# Patient Record
Sex: MALE | Race: WHITE | Employment: OTHER | URBAN - METROPOLITAN AREA
[De-identification: names, ages, dates, MRNs, and addresses within clinical notes are randomized per-mention and may not be internally consistent; named-entity substitution may affect disease eponyms.]

---

## 2020-11-28 ENCOUNTER — HOSPITAL ENCOUNTER (EMERGENCY)
Age: 80
Discharge: NURSING, REHAB OR LONG-TERM CARE | End: 2020-11-29
Attending: EMERGENCY MEDICINE
Payer: MEDICARE

## 2020-11-28 VITALS
BODY MASS INDEX: 31.88 KG/M2 | HEART RATE: 74 BPM | WEIGHT: 270 LBS | HEIGHT: 77 IN | RESPIRATION RATE: 18 BRPM | OXYGEN SATURATION: 97 % | SYSTOLIC BLOOD PRESSURE: 159 MMHG | DIASTOLIC BLOOD PRESSURE: 81 MMHG | TEMPERATURE: 98.2 F

## 2020-11-28 DIAGNOSIS — R45.1 AGITATION: Primary | ICD-10-CM

## 2020-11-28 LAB
ALBUMIN SERPL-MCNC: 3.7 G/DL (ref 3.5–5)
ALBUMIN/GLOB SERPL: 1.1 {RATIO} (ref 1.1–2.2)
ALP SERPL-CCNC: 41 U/L (ref 45–117)
ALT SERPL-CCNC: 52 U/L (ref 12–78)
ANION GAP SERPL CALC-SCNC: 7 MMOL/L (ref 5–15)
AST SERPL W P-5'-P-CCNC: 36 U/L (ref 15–37)
BASOPHILS # BLD: 0 K/UL (ref 0–0.1)
BASOPHILS NFR BLD: 0 % (ref 0–1)
BILIRUB SERPL-MCNC: 0.4 MG/DL (ref 0.2–1)
BUN SERPL-MCNC: 20 MG/DL (ref 6–20)
BUN/CREAT SERPL: 12 (ref 12–20)
CA-I BLD-MCNC: 9.1 MG/DL (ref 8.5–10.1)
CHLORIDE SERPL-SCNC: 105 MMOL/L (ref 97–108)
CO2 SERPL-SCNC: 28 MMOL/L (ref 21–32)
CREAT SERPL-MCNC: 1.66 MG/DL (ref 0.7–1.3)
DIFFERENTIAL METHOD BLD: NORMAL
EOSINOPHIL # BLD: 0.2 K/UL (ref 0–0.4)
EOSINOPHIL NFR BLD: 3 % (ref 0–7)
ERYTHROCYTE [DISTWIDTH] IN BLOOD BY AUTOMATED COUNT: 13.2 % (ref 11.5–14.5)
GLOBULIN SER CALC-MCNC: 3.4 G/DL (ref 2–4)
GLUCOSE SERPL-MCNC: 100 MG/DL (ref 65–100)
HCT VFR BLD AUTO: 40.4 % (ref 36.6–50.3)
HGB BLD-MCNC: 14 G/DL (ref 12.1–17)
IMM GRANULOCYTES # BLD AUTO: 0 K/UL (ref 0–0.04)
IMM GRANULOCYTES NFR BLD AUTO: 0 % (ref 0–0.5)
LYMPHOCYTES # BLD: 1 K/UL (ref 0.8–3.5)
LYMPHOCYTES NFR BLD: 16 % (ref 12–49)
MCH RBC QN AUTO: 31 PG (ref 26–34)
MCHC RBC AUTO-ENTMCNC: 34.7 G/DL (ref 30–36.5)
MCV RBC AUTO: 89.4 FL (ref 80–99)
MONOCYTES # BLD: 0.6 K/UL (ref 0–1)
MONOCYTES NFR BLD: 9 % (ref 5–13)
NEUTS SEG # BLD: 4.5 K/UL (ref 1.8–8)
NEUTS SEG NFR BLD: 72 % (ref 32–75)
PLATELET # BLD AUTO: 230 K/UL (ref 150–400)
PMV BLD AUTO: 9.1 FL (ref 8.9–12.9)
POTASSIUM SERPL-SCNC: 3.7 MMOL/L (ref 3.5–5.1)
PROT SERPL-MCNC: 7.1 G/DL (ref 6.4–8.2)
RBC # BLD AUTO: 4.52 M/UL (ref 4.1–5.7)
SODIUM SERPL-SCNC: 140 MMOL/L (ref 136–145)
TROPONIN I SERPL-MCNC: <0.05 NG/ML
WBC # BLD AUTO: 6.3 K/UL (ref 4.1–11.1)

## 2020-11-28 PROCEDURE — 74011250636 HC RX REV CODE- 250/636: Performed by: EMERGENCY MEDICINE

## 2020-11-28 PROCEDURE — 96374 THER/PROPH/DIAG INJ IV PUSH: CPT

## 2020-11-28 PROCEDURE — 96372 THER/PROPH/DIAG INJ SC/IM: CPT

## 2020-11-28 PROCEDURE — 74011000250 HC RX REV CODE- 250: Performed by: EMERGENCY MEDICINE

## 2020-11-28 PROCEDURE — 85025 COMPLETE CBC W/AUTO DIFF WBC: CPT

## 2020-11-28 PROCEDURE — 36415 COLL VENOUS BLD VENIPUNCTURE: CPT

## 2020-11-28 PROCEDURE — 93005 ELECTROCARDIOGRAM TRACING: CPT

## 2020-11-28 PROCEDURE — 81001 URINALYSIS AUTO W/SCOPE: CPT

## 2020-11-28 PROCEDURE — 84484 ASSAY OF TROPONIN QUANT: CPT

## 2020-11-28 PROCEDURE — 99284 EMERGENCY DEPT VISIT MOD MDM: CPT

## 2020-11-28 PROCEDURE — 80053 COMPREHEN METABOLIC PANEL: CPT

## 2020-11-28 RX ORDER — HALOPERIDOL 5 MG/ML
5 INJECTION INTRAMUSCULAR
Status: COMPLETED | OUTPATIENT
Start: 2020-11-28 | End: 2020-11-28

## 2020-11-28 RX ADMIN — WATER 10 MG: 1 INJECTION INTRAMUSCULAR; INTRAVENOUS; SUBCUTANEOUS at 21:43

## 2020-11-28 RX ADMIN — HALOPERIDOL LACTATE 5 MG: 5 INJECTION, SOLUTION INTRAMUSCULAR at 21:07

## 2020-11-29 LAB
APPEARANCE UR: ABNORMAL
ATRIAL RATE: 76 BPM
BACTERIA URNS QL MICRO: NEGATIVE /HPF
BILIRUB UR QL: NEGATIVE
CALCULATED P AXIS, ECG09: 60 DEGREES
CALCULATED R AXIS, ECG10: 19 DEGREES
CALCULATED T AXIS, ECG11: 70 DEGREES
COLOR UR: ABNORMAL
DIAGNOSIS, 93000: NORMAL
GLUCOSE UR STRIP.AUTO-MCNC: NEGATIVE MG/DL
HGB UR QL STRIP: ABNORMAL
KETONES UR QL STRIP.AUTO: NEGATIVE MG/DL
LEUKOCYTE ESTERASE UR QL STRIP.AUTO: NEGATIVE
NITRITE UR QL STRIP.AUTO: NEGATIVE
P-R INTERVAL, ECG05: 166 MS
PH UR STRIP: 5 [PH] (ref 5–8)
PROT UR STRIP-MCNC: 100 MG/DL
Q-T INTERVAL, ECG07: 424 MS
QRS DURATION, ECG06: 88 MS
QTC CALCULATION (BEZET), ECG08: 477 MS
RBC #/AREA URNS HPF: >100 /HPF (ref 0–5)
SP GR UR REFRACTOMETRY: 1.01 (ref 1–1.03)
UA: UC IF INDICATED,UAUC: ABNORMAL
UROBILINOGEN UR QL STRIP.AUTO: 0.1 EU/DL (ref 0.1–1)
VENTRICULAR RATE, ECG03: 76 BPM
WBC URNS QL MICRO: ABNORMAL /HPF (ref 0–4)

## 2020-11-29 NOTE — DISCHARGE INSTRUCTIONS
Be sure to follow up your PCP in 2-3 days, get repeat blood work, your creatinine is 1.66 today which suggests mild kidney dysfunction but does not explain your behavioral change. Return to ED for acute injuries, acute infectious signs, violent behavior or self harm behavior.

## 2020-11-29 NOTE — ED NOTES
Past Medical History:   Diagnosis Date    [facility] ([facility]     CAD (coronary artery disease)     Dementia ([facility]     Psychiatric disorder      History reviewed. No pertinent surgical history. 72yo male pt with significant PMH of alzheimer's disease and dementia presents to ED from [facility] with report from staff of altered mental status and combative behavior. Pt is unable to provide history due to tangentiality and word salad, confused and unredirectable, agitated, restless, wandering in room, frequent attempts to reorient to room and call bell, cardiac and continuous spO2 monitoring initiated, IV started, blood samples collected and sent to lab for analysis, EKG completed, medicated with Haldol and Geodon with little result, plan of care reviewed, call bell in reach, side rails up x2, will continue to monitor. 00:28 AM  Straight cath for urine, some bloody discharge noted, urine sample collected and sent, pt resting comfortably, awaiting results. 1:28 AM  Pt OOB, wandering in room, urinating of floor, found lying sideways on chair and bed. Cleaned, dressed, EMS at bedside for transport. Provider at bedside for dispo and follow up, all treatments completed as ordered. Discharge plan reviewed and paperwork signed, teaching completed regarding treatment received, medications and follow up care demonstrated and read back, IV removed, catheter intact, pain level within manageable comfortable limits, ambulatory to exit, gait steady, safety maintained.

## 2020-11-29 NOTE — ED PROVIDER NOTES
HPI   Chief Complaint   Patient presents with    Altered mental status   81yoM with hx CAD and psychiatric disorder presents from [facility] with report from staff of altered mental status and combative behavior. Pt is unable to provide history due to tangentiality and word salad. Past Medical History:   Diagnosis Date    [facility] ([facility]     CAD (coronary artery disease)     Dementia ([facility]     Psychiatric disorder        History reviewed. No pertinent surgical history. History reviewed. No pertinent family history. Social History     Socioeconomic History    Marital status:      Spouse name: Not on file    Number of children: Not on file    Years of education: Not on file    Highest education level: Not on file   Occupational History    Not on file   Social Needs    Financial resource strain: Not on file    Food insecurity     Worry: Not on file     Inability: Not on file    Transportation needs     Medical: Not on file     Non-medical: Not on file   Tobacco Use    Smoking status: Not on file   Substance and Sexual Activity    Alcohol use: Not Currently    Drug use: Not Currently    Sexual activity: Not on file   Lifestyle    Physical activity     Days per week: Not on file     Minutes per session: Not on file    Stress: Not on file   Relationships    Social connections     Talks on phone: Not on file     Gets together: Not on file     Attends Synagogue service: Not on file     Active member of club or organization: Not on file     Attends meetings of clubs or organizations: Not on file     Relationship status: Not on file    Intimate partner violence     Fear of current or ex partner: Not on file     Emotionally abused: Not on file     Physically abused: Not on file     Forced sexual activity: Not on file   Other Topics Concern    Not on file   Social History Narrative    Not on file         ALLERGIES: Patient has no known allergies.     Review of Systems   Unable to perform ROS: Psychiatric disorder       Vitals:    11/28/20 1934   BP: (!) 159/81   Pulse: 74   Resp: 18   Temp: 98.2 °F (36.8 °C)   SpO2: 97%   Weight: 122.5 kg (270 lb)   Height: 6' 5\" (1.956 m)            Physical Exam   Patient Vitals for the past 8 hrs:   Temp Pulse Resp BP SpO2   11/28/20 1934 98.2 °F (36.8 °C) 74 18 (!) 159/81 97 %        Nursing note and vitals reviewed. Constitutional: Walking around ED in NAD. Head: Normocephalic and atraumatic. Mouth/Throat: Airway patent. Moist mucous membranes. Eyes: EOMI. No Scleral icterus. Neck: Neck supple. Cardiovascular: Well perfused throughout. Pulmonary/Chest: No respiratory distress. Musculoskeletal: No gross injuries or deformities. Neurological: Alert and but not oriented. Cranial Nerves 2-12 intact. Moving all extremities. No gross deficits  Psych: Flat affect. Unable to assess mood. Speech is fluent but very tangential and mostly word salad. Not threatening toward staff. Intermittently redirectable. Skin: Skin is warm and dry. No rash noted. MDM   Ddx = baseline psychiatric problem, metabolic disarray, ACS, UTI. Low suspicion acute head injury. EKG read by me at 20:39 showing NSR rate 79, no STEMI, normal intervals. Pt given haldol for walking all over ED, not staying in his room, as we have a covid-19 pandemic and there are covid-19 PUIs in the ED he required haldol for his own safety. On re-assessment pt still trying to get out of his room. Given 10mg geodon with good improvement of behavior. Lab work shows creatinine 1.66, likely not prerenal or post obstructive as BUN is normal.   Signed out at 10pm to Dr. Ayoub Congress to f/u UA. Expect d/c to f/u PCP in 2-3 days. Labs Reviewed   METABOLIC PANEL, COMPREHENSIVE - Abnormal; Notable for the following components:       Result Value    Creatinine 1.66 (*)     GFR est AA 49 (*)     GFR est non-AA 40 (*)     Alk.  phosphatase 41 (*)     All other components within normal limits CBC WITH AUTOMATED DIFF   TROPONIN I   URINALYSIS W/ REFLEX CULTURE     No orders to display     Medications   haloperidol lactate (HALDOL) injection 5 mg (5 mg IntraVENous Given 11/28/20 2107)   ziprasidone (GEODON) 10 mg in sterile water (preservative free) 0.5 mL injection (10 mg IntraMUSCular Given 11/28/20 2143)     Thelma SUTHERLAND MD, am  the first and primary ED provider for this patient.           Procedures

## 2020-12-05 ENCOUNTER — APPOINTMENT (OUTPATIENT)
Dept: GENERAL RADIOLOGY | Age: 80
End: 2020-12-05
Attending: EMERGENCY MEDICINE
Payer: MEDICARE

## 2020-12-05 ENCOUNTER — APPOINTMENT (OUTPATIENT)
Dept: CT IMAGING | Age: 80
End: 2020-12-05
Attending: EMERGENCY MEDICINE
Payer: MEDICARE

## 2020-12-05 ENCOUNTER — HOSPITAL ENCOUNTER (EMERGENCY)
Age: 80
Discharge: HOME | End: 2020-12-05
Attending: EMERGENCY MEDICINE
Payer: MEDICARE

## 2020-12-05 VITALS
DIASTOLIC BLOOD PRESSURE: 86 MMHG | OXYGEN SATURATION: 96 % | WEIGHT: 274 LBS | HEART RATE: 92 BPM | HEIGHT: 74 IN | SYSTOLIC BLOOD PRESSURE: 164 MMHG | BODY MASS INDEX: 35.16 KG/M2 | TEMPERATURE: 97.7 F | RESPIRATION RATE: 18 BRPM

## 2020-12-05 DIAGNOSIS — S00.93XA CONTUSION OF HEAD, UNSPECIFIED PART OF HEAD, INITIAL ENCOUNTER: ICD-10-CM

## 2020-12-05 DIAGNOSIS — F02.80 ALZHEIMER'S DEMENTIA WITHOUT BEHAVIORAL DISTURBANCE, UNSPECIFIED TIMING OF DEMENTIA ONSET: ICD-10-CM
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
## 2020-12-05 DIAGNOSIS — R41.0 CONFUSION: ICD-10-CM

## 2020-12-05 DIAGNOSIS — G30.9 ALZHEIMER'S DEMENTIA WITHOUT BEHAVIORAL DISTURBANCE, UNSPECIFIED TIMING OF DEMENTIA ONSET: ICD-10-CM

## 2020-12-05 LAB
ALBUMIN SERPL-MCNC: 3.5 G/DL (ref 3.5–5)
ALBUMIN/GLOB SERPL: 0.9 {RATIO} (ref 1.1–2.2)
ALP SERPL-CCNC: 47 U/L (ref 45–117)
ALT SERPL-CCNC: 39 U/L (ref 12–78)
ANION GAP SERPL CALC-SCNC: 7 MMOL/L (ref 5–15)
APPEARANCE UR: CLEAR
AST SERPL W P-5'-P-CCNC: 42 U/L (ref 15–37)
ATRIAL RATE: 90 BPM
BACTERIA URNS QL MICRO: NEGATIVE /HPF
BASOPHILS # BLD: 0 K/UL (ref 0–0.1)
BASOPHILS NFR BLD: 0 % (ref 0–1)
BILIRUB SERPL-MCNC: 0.6 MG/DL (ref 0.2–1)
BILIRUB UR QL: NEGATIVE
BNP SERPL-MCNC: 224 PG/ML
BUN SERPL-MCNC: 24 MG/DL (ref 6–20)
BUN/CREAT SERPL: 17 (ref 12–20)
CA-I BLD-MCNC: 8.6 MG/DL (ref 8.5–10.1)
CALCULATED P AXIS, ECG09: 38 DEGREES
CALCULATED R AXIS, ECG10: 19 DEGREES
CALCULATED T AXIS, ECG11: 45 DEGREES
CHLORIDE SERPL-SCNC: 108 MMOL/L (ref 97–108)
CO2 SERPL-SCNC: 26 MMOL/L (ref 21–32)
COLOR UR: ABNORMAL
CREAT SERPL-MCNC: 1.38 MG/DL (ref 0.7–1.3)
DIAGNOSIS, 93000: NORMAL
DIFFERENTIAL METHOD BLD: ABNORMAL
EOSINOPHIL # BLD: 0 K/UL (ref 0–0.4)
EOSINOPHIL NFR BLD: 0 % (ref 0–7)
ERYTHROCYTE [DISTWIDTH] IN BLOOD BY AUTOMATED COUNT: 13 % (ref 11.5–14.5)
GLOBULIN SER CALC-MCNC: 3.9 G/DL (ref 2–4)
GLUCOSE SERPL-MCNC: 111 MG/DL (ref 65–100)
GLUCOSE UR STRIP.AUTO-MCNC: NEGATIVE MG/DL
HCT VFR BLD AUTO: 41.3 % (ref 36.6–50.3)
HGB BLD-MCNC: 14.7 G/DL (ref 12.1–17)
HGB UR QL STRIP: ABNORMAL
IMM GRANULOCYTES # BLD AUTO: 0 K/UL (ref 0–0.04)
IMM GRANULOCYTES NFR BLD AUTO: 0 % (ref 0–0.5)
KETONES UR QL STRIP.AUTO: 20 MG/DL
LEUKOCYTE ESTERASE UR QL STRIP.AUTO: NEGATIVE
LYMPHOCYTES # BLD: 0.5 K/UL (ref 0.8–3.5)
LYMPHOCYTES NFR BLD: 4 % (ref 12–49)
MCH RBC QN AUTO: 31.7 PG (ref 26–34)
MCHC RBC AUTO-ENTMCNC: 35.6 G/DL (ref 30–36.5)
MCV RBC AUTO: 89 FL (ref 80–99)
MONOCYTES # BLD: 0.8 K/UL (ref 0–1)
MONOCYTES NFR BLD: 8 % (ref 5–13)
MUCOUS THREADS URNS QL MICRO: ABNORMAL /LPF
NEUTS SEG # BLD: 9.3 K/UL (ref 1.8–8)
NEUTS SEG NFR BLD: 88 % (ref 32–75)
NITRITE UR QL STRIP.AUTO: NEGATIVE
P-R INTERVAL, ECG05: 176 MS
PH UR STRIP: 5 [PH] (ref 5–8)
PLATELET # BLD AUTO: 243 K/UL (ref 150–400)
PMV BLD AUTO: 9.3 FL (ref 8.9–12.9)
POTASSIUM SERPL-SCNC: 3.7 MMOL/L (ref 3.5–5.1)
PROT SERPL-MCNC: 7.4 G/DL (ref 6.4–8.2)
PROT UR STRIP-MCNC: >300 MG/DL
Q-T INTERVAL, ECG07: 386 MS
QRS DURATION, ECG06: 88 MS
QTC CALCULATION (BEZET), ECG08: 472 MS
RBC # BLD AUTO: 4.64 M/UL (ref 4.1–5.7)
RBC #/AREA URNS HPF: ABNORMAL /HPF (ref 0–5)
SODIUM SERPL-SCNC: 141 MMOL/L (ref 136–145)
SP GR UR REFRACTOMETRY: 1.02 (ref 1–1.03)
UA: UC IF INDICATED,UAUC: ABNORMAL
UROBILINOGEN UR QL STRIP.AUTO: 2 EU/DL (ref 0.1–1)
VENTRICULAR RATE, ECG03: 90 BPM
WBC # BLD AUTO: 10.6 K/UL (ref 4.1–11.1)
WBC URNS QL MICRO: ABNORMAL /HPF (ref 0–4)

## 2020-12-05 PROCEDURE — 71045 X-RAY EXAM CHEST 1 VIEW: CPT

## 2020-12-05 PROCEDURE — 83880 ASSAY OF NATRIURETIC PEPTIDE: CPT

## 2020-12-05 PROCEDURE — 72170 X-RAY EXAM OF PELVIS: CPT

## 2020-12-05 PROCEDURE — 81001 URINALYSIS AUTO W/SCOPE: CPT

## 2020-12-05 PROCEDURE — 85025 COMPLETE CBC W/AUTO DIFF WBC: CPT

## 2020-12-05 PROCEDURE — 70450 CT HEAD/BRAIN W/O DYE: CPT

## 2020-12-05 PROCEDURE — 99284 EMERGENCY DEPT VISIT MOD MDM: CPT

## 2020-12-05 PROCEDURE — 72125 CT NECK SPINE W/O DYE: CPT

## 2020-12-05 PROCEDURE — 93005 ELECTROCARDIOGRAM TRACING: CPT

## 2020-12-05 PROCEDURE — 36415 COLL VENOUS BLD VENIPUNCTURE: CPT

## 2020-12-05 PROCEDURE — 80053 COMPREHEN METABOLIC PANEL: CPT

## 2020-12-05 NOTE — ED NOTES
GCs 15 pt discharged back to [facility].  All personal  Belongings taken and report called

## 2020-12-05 NOTE — ED TRIAGE NOTES
Pt is from [facility] where staff found him on the floor with his tray table; unsure if pt had any LOC jose blood thinners; pt is alert

## 2020-12-05 NOTE — ED PROVIDER NOTES
EMERGENCY DEPARTMENT HISTORY AND PHYSICAL EXAM      Date: 12/5/2020  Patient Name: Sukhjinder Dodson    History of Presenting Illness     Chief Complaint   Patient presents with    Fall       History Provided By: Patient and EMS    HPI: Sukhjinder Dodson, 68 y.o. male presents to the ED with cc of   Chief Complaint   Patient presents with    Fall     Pt is from [facility] where staff found him on the floor with his tray table; unsure if pt had any LOC jose blood thinners; pt is alert  patient is states that he fell asleep denies any complaint at this time history of dementia and Alzheimer's disease there are no other complaints, changes, or physical findings at this time. PCP: Katherine Hall MD    No current facility-administered medications on file prior to encounter. No current outpatient medications on file prior to encounter. Past History     Past Medical History:  Past Medical History:   Diagnosis Date    [facility] ([facility] [address]     CAD (coronary artery disease)     Dementia ([facility]     Psychiatric disorder        Past Surgical History:  No past surgical history on file. Family History:  No family history on file. Social History:  Social History     Tobacco Use    Smoking status: Not on file   Substance Use Topics    Alcohol use: Not Currently    Drug use: Not Currently       Allergies:  No Known Allergies      Review of Systems   Review of Systems   Unable to perform ROS: Dementia   Constitutional: Negative. HENT: Negative for congestion, facial swelling, rhinorrhea and sore throat. Eyes: Negative. Negative for photophobia and pain. Respiratory: Negative for cough, shortness of breath and wheezing. Cardiovascular: Negative. Negative for chest pain. Gastrointestinal: Negative for abdominal distention and abdominal pain. Genitourinary: Negative. Musculoskeletal: Negative. Allergic/Immunologic: Negative for immunocompromised state. Neurological: Positive for syncope and weakness. Hematological: Negative. Psychiatric/Behavioral: Negative. Physical Exam   Physical Exam  Vitals signs and nursing note reviewed. Constitutional:       Appearance: Normal appearance. He is normal weight. HENT:      Head: Normocephalic and atraumatic. Jaw: There is normal jaw occlusion. Right Ear: Tympanic membrane and external ear normal.      Left Ear: Tympanic membrane and external ear normal.      Nose: Nose normal.      Mouth/Throat:      Mouth: Mucous membranes are moist.      Pharynx: Oropharynx is clear. Eyes:      General: Lids are normal.         Right eye: No foreign body or hordeolum. Left eye: No foreign body or hordeolum. Neck:      Musculoskeletal: Normal range of motion and neck supple. Normal range of motion. No neck rigidity or muscular tenderness. Thyroid: No thyroid mass. Vascular: No carotid bruit. Trachea: No tracheal tenderness. Cardiovascular:      Rate and Rhythm: Normal rate and regular rhythm. Pulses: Normal pulses. Heart sounds: Normal heart sounds. Pulmonary:      Effort: Pulmonary effort is normal.      Breath sounds: Normal breath sounds. Abdominal:      General: Abdomen is flat. Bowel sounds are normal.      Palpations: Abdomen is soft. There is no mass. Tenderness: There is no abdominal tenderness. There is no right CVA tenderness, left CVA tenderness, guarding or rebound. Hernia: No hernia is present. Musculoskeletal: Normal range of motion. Skin:     General: Skin is warm. Findings: No lesion or rash. Neurological:      General: No focal deficit present. Mental Status: He is alert. Mental status is at baseline. GCS: GCS eye subscore is 4. GCS verbal subscore is 5. GCS motor subscore is 6. Sensory: Sensation is intact. Motor: Motor function is intact. Deep Tendon Reflexes: Reflexes are normal and symmetric. Comments: Nonfocal   Psychiatric:         Attention and Perception: Attention and perception normal.         Mood and Affect: Mood is anxious. Diagnostic Study Results     Labs -     Recent Results (from the past 12 hour(s))   EKG, 12 LEAD, INITIAL    Collection Time: 12/05/20 12:16 PM   Result Value Ref Range    Ventricular Rate 90 BPM    Atrial Rate 90 BPM    P-R Interval 176 ms    QRS Duration 88 ms    Q-T Interval 386 ms    QTC Calculation (Bezet) 472 ms    Calculated P Axis 38 degrees    Calculated R Axis 19 degrees    Calculated T Axis 45 degrees    Diagnosis       Normal sinus rhythm  Nonspecific ST abnormality  Abnormal ECG  When compared with ECG of 28-NOV-2020 20:39,  No significant change was found  Confirmed by MORGAN OCAMPO MD ([id number]) on 12/5/2020 12:59:13 PM     BNP    Collection Time: 12/05/20 12:30 PM   Result Value Ref Range    NT pro- <450 pg/mL   CBC WITH AUTOMATED DIFF    Collection Time: 12/05/20 12:30 PM   Result Value Ref Range    WBC 10.6 4.1 - 11.1 K/uL    RBC 4.64 4.10 - 5.70 M/uL    HGB 14.7 12.1 - 17.0 g/dL    HCT 41.3 36.6 - 50.3 %    MCV 89.0 80.0 - 99.0 FL    MCH 31.7 26.0 - 34.0 PG    MCHC 35.6 30.0 - 36.5 g/dL    RDW 13.0 11.5 - 14.5 %    PLATELET 164 337 - 742 K/uL    MPV 9.3 8.9 - 12.9 FL    NEUTROPHILS 88 (H) 32 - 75 %    LYMPHOCYTES 4 (L) 12 - 49 %    MONOCYTES 8 5 - 13 %    EOSINOPHILS 0 0 - 7 %    BASOPHILS 0 0 - 1 %    IMMATURE GRANULOCYTES 0 0.0 - 0.5 %    ABS. NEUTROPHILS 9.3 (H) 1.8 - 8.0 K/UL    ABS. LYMPHOCYTES 0.5 (L) 0.8 - 3.5 K/UL    ABS. MONOCYTES 0.8 0.0 - 1.0 K/UL    ABS. EOSINOPHILS 0.0 0.0 - 0.4 K/UL    ABS. BASOPHILS 0.0 0.0 - 0.1 K/UL    ABS. IMM.  GRANS. 0.0 0.00 - 0.04 K/UL    DF AUTOMATED     METABOLIC PANEL, COMPREHENSIVE    Collection Time: 12/05/20 12:30 PM   Result Value Ref Range    Sodium 141 136 - 145 mmol/L    Potassium 3.7 3.5 - 5.1 mmol/L    Chloride 108 97 - 108 mmol/L    CO2 26 21 - 32 mmol/L    Anion gap 7 5 - 15 mmol/L    Glucose 111 (H) 65 - 100 mg/dL    BUN 24 (H) 6 - 20 mg/dL    Creatinine 1.38 (H) 0.70 - 1.30 mg/dL    BUN/Creatinine ratio 17 12 - 20      GFR est AA >60 >60 ml/min/1.73m2    GFR est non-AA 50 (L) >60 ml/min/1.73m2    Calcium 8.6 8.5 - 10.1 mg/dL    Bilirubin, total 0.6 0.2 - 1.0 mg/dL    AST (SGOT) 42 (H) 15 - 37 U/L    ALT (SGPT) 39 12 - 78 U/L    Alk. phosphatase 47 45 - 117 U/L    Protein, total 7.4 6.4 - 8.2 g/dL    Albumin 3.5 3.5 - 5.0 g/dL    Globulin 3.9 2.0 - 4.0 g/dL    A-G Ratio 0.9 (L) 1.1 - 2.2     URINALYSIS W/ REFLEX CULTURE    Collection Time: 12/05/20 12:35 PM    Specimen: Urine   Result Value Ref Range    Color Yellow/Straw      Appearance Clear Clear      Specific gravity 1.020 1.003 - 1.030      pH (UA) 5.0 5.0 - 8.0      Protein >300 (A) Negative mg/dL    Glucose Negative Negative mg/dL    Ketone 20 (A) Negative mg/dL    Bilirubin Negative Negative      Blood Small (A) Negative      Urobilinogen 2.0 (H) 0.1 - 1.0 EU/dL    Nitrites Negative Negative      Leukocyte Esterase Negative Negative      UA:UC IF INDICATED Culture not indicated by UA result Culture not indicated by UA result      WBC 0-4 0 - 4 /hpf    RBC 0-5 0 - 5 /hpf    Bacteria Negative Negative /hpf    Mucus Trace /lpf       Labs reviewed by me    Radiologic Studies -   XR CHEST SNGL V   Final Result   Impression:   No acute cardiopulmonary process. XR PELV 1 OR 2 V   Final Result   FINDINGS/IMPRESSION:   Examination negative for fracture, negative for dislocation. Senescent change   lumbar spine and bilateral hips, right greater than left. Punctate radiopaque foreign bodies project over the pubic symphysis, this may   represent sequela of prior hernia repair. If patient's pain persists or there is high clinical suspicion for a   radiographically occult fracture, further characterization can be obtained with   cross-sectional imaging. CT HEAD WO CONT   Final Result   Impression:      1. No acute finding.    2. Mild diffuse cerebral atrophy. 3. Intracranial atherosclerosis. CT SPINE CERV WO CONT   Final Result   Impression:    1. No acute abnormality of the cervical spine. 2. Multilevel cervical degenerative changes as detailed above. 3. Bilateral carotid bifurcation calcific atherosclerosis. CT Results  (Last 48 hours)               12/05/20 1245  CT HEAD WO CONT Final result    Impression:  Impression:       1. No acute finding. 2. Mild diffuse cerebral atrophy. 3. Intracranial atherosclerosis. Narrative: Indication: Fall. Comparison: None. Technique: Contiguous axial images of the brain were obtained from the base of   skull to the vertex without intravenous contrast.       Dose Reduction:        All CT scans at this facility are performed using dose reduction optimization   techniques as appropriate to a performed exam including the following: Automated   exposure control, adjustments of the mA and/or kV according to patient size, or   use of iterative reconstruction technique. Findings: The brain parenchyma is normal. There is no evidence of hemorrhage,   mass effect, or midline shift. Mild diffuse cerebral atrophy. The calvarium is   intact. Mastoid air cells and paranasal sinuses are clear. Intracranial   atherosclerosis. 12/05/20 1245  CT SPINE CERV WO CONT Final result    Impression:  Impression:    1. No acute abnormality of the cervical spine. 2. Multilevel cervical degenerative changes as detailed above. 3. Bilateral carotid bifurcation calcific atherosclerosis. Narrative:  CT cervical spine 12/4/2020: Indication: Fall. Comparison: None. Technique: Contiguous thin section axial images of the cervical spine were   obtained with coronal and sagittal reconstructions performed and evaluated.        Dose Reduction:        All CT scans at this facility are performed using dose reduction optimization   techniques as appropriate to a performed exam including the following: Automated   exposure control, adjustments of the mA and/or kV according to patient size, or   use of iterative reconstruction technique. Findings: No acute fracture. Mild diffuse cervical spondylosis without focal   herniation or stenosis. Asymmetric left-sided facet arthropathy with   uncovertebral spurring contribute to advanced left spondylotic foraminal   narrowing at C3-4, C4-5, C5-6, and C6-7. Mild bilateral carotid bifurcation   calcific atherosclerosis. Lung apices are clear. CXR Results  (Last 48 hours)               12/05/20 1307  XR CHEST SNGL V Final result    Impression:  Impression:   No acute cardiopulmonary process. Narrative:  XR CHEST SNGL V       Comparison: None       Findings: The lungs are adequately inflated without focal consolidation. Cardiac   silhouette is within normal limits for size, no evidence of cardiac   decompensation. The osseous structures are intact. Medical Decision Making     I am the first provider for this patient. I reviewed the vital signs, available nursing notes, past medical history, past surgical history, family history and social history. RADIOLOGY report and LABS reviewed by me    Vital Signs-Reviewed the patient's vital signs. Patient Vitals for the past 12 hrs:   Temp Pulse Resp BP SpO2   12/05/20 1213 97.7 °F (36.5 °C) 92 18 (!) 164/86 96 %       EKG interpretation: (Preliminary)  EKG done at 1216 shows normal sinus rhythm, normal axis, nonspecific ST with no ectopy, ventricular rate of 90        Records Reviewed: Nurse's note. Provider Notes (Medical Decision Making):    Patient presents with DIFF DX :         ED Course:   Initial assessment performed. The patients presenting problems have been discussed, and they are in agreement with the care plan formulated and outlined with them. I have encouraged them to ask questions as they arise throughout their visit. TREATMENT RESPONSE -Stable          Chica Brown MD      Disposition:  Discharged   Diagnostic tests were reviewed and questions answered. Diagnosis, care plan and treatment options were discussed. The patient understand instructions and will follow up as directed. Condition stable    Admitting Provider:  No admitting provider for patient encounter. Consulting Provider:  No ref. provider found       DISCHARGE PLAN:  1. There are no discharge medications for this patient. 2.   Follow-up Information     Follow up With Specialties Details Why Naveen Yanes MD Family Medicine In 1 day  [address] [facility] [address] [phone]          3. Return to ED if worse     Diagnosis     Clinical Impression:     ICD-10-CM ICD-9-CM    1. Fall, initial encounter  [address] XXXA E888.9    2. Contusion of head, unspecified part of head, initial encounter  S00.93XA 920    3. Confusion  R41.0 298.9    4. Alzheimer's dementia without behavioral disturbance, unspecified timing of [facility] ([facility]  G30.9 331.0     F02.80 294.10         Attestations:    Chica Brown MD    Please note that this dictation was completed with D[employer], the [employer] voice recognition software. Quite often unanticipated grammatical, syntax, homophones, and other interpretive errors are inadvertently transcribed by the computer software. Please disregard these errors. Please excuse any errors that have escaped final proofreading. Thank you.

## 2020-12-28 ENCOUNTER — HOSPITAL ENCOUNTER (INPATIENT)
Age: 80
LOS: 28 days | Discharge: NURSING, REHAB OR LONG-TERM CARE | DRG: 056 | End: 2021-01-26
Attending: EMERGENCY MEDICINE | Admitting: INTERNAL MEDICINE
Payer: MEDICARE

## 2020-12-28 ENCOUNTER — APPOINTMENT (OUTPATIENT)
Dept: CT IMAGING | Age: 80
DRG: 056 | End: 2020-12-28
Attending: EMERGENCY MEDICINE
Payer: MEDICARE

## 2020-12-28 ENCOUNTER — APPOINTMENT (OUTPATIENT)
Dept: GENERAL RADIOLOGY | Age: 80
DRG: 056 | End: 2020-12-28
Attending: EMERGENCY MEDICINE
Payer: MEDICARE

## 2020-12-28 DIAGNOSIS — R41.82 ALTERED MENTAL STATUS, UNSPECIFIED ALTERED MENTAL STATUS TYPE: ICD-10-CM

## 2020-12-28 DIAGNOSIS — Z86.59 HISTORY OF DEMENTIA: ICD-10-CM

## 2020-12-28 DIAGNOSIS — Z20.822 SUSPECTED COVID-19 VIRUS INFECTION: ICD-10-CM

## 2020-12-28 DIAGNOSIS — R50.9 FEVER, UNSPECIFIED FEVER CAUSE: Primary | ICD-10-CM

## 2020-12-28 PROCEDURE — 80053 COMPREHEN METABOLIC PANEL: CPT

## 2020-12-28 PROCEDURE — 93005 ELECTROCARDIOGRAM TRACING: CPT

## 2020-12-28 PROCEDURE — 87040 BLOOD CULTURE FOR BACTERIA: CPT

## 2020-12-28 PROCEDURE — 96365 THER/PROPH/DIAG IV INF INIT: CPT

## 2020-12-28 PROCEDURE — 71045 X-RAY EXAM CHEST 1 VIEW: CPT

## 2020-12-28 PROCEDURE — 36415 COLL VENOUS BLD VENIPUNCTURE: CPT

## 2020-12-28 PROCEDURE — 85025 COMPLETE CBC W/AUTO DIFF WBC: CPT

## 2020-12-28 PROCEDURE — 99285 EMERGENCY DEPT VISIT HI MDM: CPT

## 2020-12-28 PROCEDURE — 83605 ASSAY OF LACTIC ACID: CPT

## 2020-12-28 NOTE — Clinical Note
Status[de-identified] INPATIENT [101]   Type of Bed: Medical [8]   Inpatient Hospitalization Certified Necessary for the Following Reasons: 1.  Patient Failed outpatient treatment (further clarification in H&P documentation)   Admitting Diagnosis: AMS (altered mental status) [7419091]   Admitting Diagnosis: Fever [[id number]]   Admitting Physician: Mayo Harry   Attending Physician: Deni Daigle   Estimated Length of Stay: 2 Midnights   Discharge Plan[de-identified] Extended Care Facility (e.g. Adult Home, Nursing Home, etc.)

## 2020-12-29 ENCOUNTER — APPOINTMENT (OUTPATIENT)
Dept: CT IMAGING | Age: 80
DRG: 056 | End: 2020-12-29
Attending: EMERGENCY MEDICINE
Payer: MEDICARE

## 2020-12-29 PROBLEM — R41.82 AMS (ALTERED MENTAL STATUS): Status: ACTIVE | Noted: 2020-12-29

## 2020-12-29 PROBLEM — G93.40 ENCEPHALOPATHY ACUTE: Status: ACTIVE | Noted: 2020-12-29

## 2020-12-29 PROBLEM — R50.9 FEVER: Status: ACTIVE | Noted: 2020-12-29

## 2020-12-29 LAB
ALBUMIN SERPL-MCNC: 3.3 G/DL (ref 3.5–5)
ALBUMIN/GLOB SERPL: 0.8 {RATIO} (ref 1.1–2.2)
ALP SERPL-CCNC: 50 U/L (ref 45–117)
ALT SERPL-CCNC: 30 U/L (ref 12–78)
ANION GAP SERPL CALC-SCNC: 4 MMOL/L (ref 5–15)
AST SERPL W P-5'-P-CCNC: 42 U/L (ref 15–37)
ATRIAL RATE: 107 BPM
BASOPHILS # BLD: 0 K/UL (ref 0–0.1)
BASOPHILS NFR BLD: 0 % (ref 0–1)
BILIRUB SERPL-MCNC: 0.7 MG/DL (ref 0.2–1)
BUN SERPL-MCNC: 14 MG/DL (ref 6–20)
BUN/CREAT SERPL: 10 (ref 12–20)
CA-I BLD-MCNC: 9.1 MG/DL (ref 8.5–10.1)
CALCULATED P AXIS, ECG09: 47 DEGREES
CALCULATED R AXIS, ECG10: 24 DEGREES
CALCULATED T AXIS, ECG11: 54 DEGREES
CHLORIDE SERPL-SCNC: 108 MMOL/L (ref 97–108)
CO2 SERPL-SCNC: 32 MMOL/L (ref 21–32)
COVID-19 RAPID TEST, COVR: NOT DETECTED
CREAT SERPL-MCNC: 1.47 MG/DL (ref 0.7–1.3)
DIAGNOSIS, 93000: NORMAL
DIFFERENTIAL METHOD BLD: ABNORMAL
EOSINOPHIL # BLD: 0 K/UL (ref 0–0.4)
EOSINOPHIL NFR BLD: 0 % (ref 0–7)
ERYTHROCYTE [DISTWIDTH] IN BLOOD BY AUTOMATED COUNT: 12.5 % (ref 11.5–14.5)
GLOBULIN SER CALC-MCNC: 4.1 G/DL (ref 2–4)
GLUCOSE SERPL-MCNC: 100 MG/DL (ref 65–100)
HCT VFR BLD AUTO: 42.1 % (ref 36.6–50.3)
HGB BLD-MCNC: 14.2 G/DL (ref 12.1–17)
IMM GRANULOCYTES # BLD AUTO: 0 K/UL (ref 0–0.04)
IMM GRANULOCYTES NFR BLD AUTO: 0 % (ref 0–0.5)
LACTATE SERPL-SCNC: 1.4 MMOL/L (ref 0.4–2)
LYMPHOCYTES # BLD: 0.7 K/UL (ref 0.8–3.5)
LYMPHOCYTES NFR BLD: 8 % (ref 12–49)
MCH RBC QN AUTO: 30.9 PG (ref 26–34)
MCHC RBC AUTO-ENTMCNC: 33.7 G/DL (ref 30–36.5)
MCV RBC AUTO: 91.7 FL (ref 80–99)
MONOCYTES # BLD: 1 K/UL (ref 0–1)
MONOCYTES NFR BLD: 12 % (ref 5–13)
NEUTS SEG # BLD: 6.7 K/UL (ref 1.8–8)
NEUTS SEG NFR BLD: 80 % (ref 32–75)
P-R INTERVAL, ECG05: 160 MS
PLATELET # BLD AUTO: 248 K/UL (ref 150–400)
PMV BLD AUTO: 9.6 FL (ref 8.9–12.9)
POTASSIUM SERPL-SCNC: 4 MMOL/L (ref 3.5–5.1)
PROT SERPL-MCNC: 7.4 G/DL (ref 6.4–8.2)
Q-T INTERVAL, ECG07: 334 MS
QRS DURATION, ECG06: 76 MS
QTC CALCULATION (BEZET), ECG08: 445 MS
RBC # BLD AUTO: 4.59 M/UL (ref 4.1–5.7)
SARS-COV-2, COV2: NORMAL
SODIUM SERPL-SCNC: 144 MMOL/L (ref 136–145)
SPECIMEN SOURCE: NORMAL
TROPONIN I SERPL-MCNC: <0.05 NG/ML
VENTRICULAR RATE, ECG03: 107 BPM
WBC # BLD AUTO: 8.5 K/UL (ref 4.1–11.1)

## 2020-12-29 PROCEDURE — 84484 ASSAY OF TROPONIN QUANT: CPT

## 2020-12-29 PROCEDURE — 87635 SARS-COV-2 COVID-19 AMP PRB: CPT

## 2020-12-29 PROCEDURE — 65270000029 HC RM PRIVATE

## 2020-12-29 PROCEDURE — 74011250636 HC RX REV CODE- 250/636: Performed by: INTERNAL MEDICINE

## 2020-12-29 PROCEDURE — 74011000258 HC RX REV CODE- 258: Performed by: INTERNAL MEDICINE

## 2020-12-29 PROCEDURE — 87086 URINE CULTURE/COLONY COUNT: CPT

## 2020-12-29 PROCEDURE — 74011000258 HC RX REV CODE- 258: Performed by: EMERGENCY MEDICINE

## 2020-12-29 PROCEDURE — 70450 CT HEAD/BRAIN W/O DYE: CPT

## 2020-12-29 PROCEDURE — 74011250636 HC RX REV CODE- 250/636: Performed by: EMERGENCY MEDICINE

## 2020-12-29 PROCEDURE — 87040 BLOOD CULTURE FOR BACTERIA: CPT

## 2020-12-29 RX ORDER — ACETAMINOPHEN 650 MG/1
650 SUPPOSITORY RECTAL
Status: DISCONTINUED | OUTPATIENT
Start: 2020-12-29 | End: 2021-01-26 | Stop reason: HOSPADM

## 2020-12-29 RX ORDER — ENOXAPARIN SODIUM 100 MG/ML
40 INJECTION SUBCUTANEOUS DAILY
Status: DISCONTINUED | OUTPATIENT
Start: 2020-12-29 | End: 2021-01-06

## 2020-12-29 RX ORDER — SODIUM CHLORIDE 0.9 % (FLUSH) 0.9 %
5-40 SYRINGE (ML) INJECTION AS NEEDED
Status: DISCONTINUED | OUTPATIENT
Start: 2020-12-29 | End: 2021-01-07

## 2020-12-29 RX ORDER — ACETAMINOPHEN 325 MG/1
650 TABLET ORAL
Status: DISCONTINUED | OUTPATIENT
Start: 2020-12-29 | End: 2021-01-26 | Stop reason: HOSPADM

## 2020-12-29 RX ORDER — POLYETHYLENE GLYCOL 3350 17 G/17G
17 POWDER, FOR SOLUTION ORAL DAILY PRN
Status: DISCONTINUED | OUTPATIENT
Start: 2020-12-29 | End: 2021-01-26 | Stop reason: HOSPADM

## 2020-12-29 RX ORDER — SODIUM CHLORIDE 0.9 % (FLUSH) 0.9 %
5-40 SYRINGE (ML) INJECTION EVERY 8 HOURS
Status: DISCONTINUED | OUTPATIENT
Start: 2020-12-29 | End: 2020-12-30

## 2020-12-29 RX ORDER — PROMETHAZINE HYDROCHLORIDE 25 MG/1
12.5 TABLET ORAL
Status: DISCONTINUED | OUTPATIENT
Start: 2020-12-29 | End: 2021-01-26 | Stop reason: HOSPADM

## 2020-12-29 RX ORDER — ONDANSETRON 2 MG/ML
4 INJECTION INTRAMUSCULAR; INTRAVENOUS
Status: DISCONTINUED | OUTPATIENT
Start: 2020-12-29 | End: 2021-01-26 | Stop reason: HOSPADM

## 2020-12-29 RX ORDER — DEXTROSE MONOHYDRATE AND SODIUM CHLORIDE 5; .45 G/100ML; G/100ML
75 INJECTION, SOLUTION INTRAVENOUS CONTINUOUS
Status: DISCONTINUED | OUTPATIENT
Start: 2020-12-29 | End: 2021-01-03

## 2020-12-29 RX ADMIN — Medication 10 ML: at 21:43

## 2020-12-29 RX ADMIN — DEXTROSE AND SODIUM CHLORIDE 75 ML/HR: 5; 450 INJECTION, SOLUTION INTRAVENOUS at 10:31

## 2020-12-29 RX ADMIN — PIPERACILLIN SODIUM AND TAZOBACTAM SODIUM 2.25 G: 2; .25 INJECTION, POWDER, LYOPHILIZED, FOR SOLUTION INTRAVENOUS at 10:31

## 2020-12-29 RX ADMIN — SODIUM CHLORIDE 1000 ML: 9 INJECTION, SOLUTION INTRAVENOUS at 00:31

## 2020-12-29 RX ADMIN — PIPERACILLIN SODIUM AND TAZOBACTAM SODIUM 4.5 G: 4; .5 INJECTION, POWDER, LYOPHILIZED, FOR SOLUTION INTRAVENOUS at 00:27

## 2020-12-29 RX ADMIN — Medication 10 ML: at 14:00

## 2020-12-29 RX ADMIN — ENOXAPARIN SODIUM 40 MG: 40 INJECTION SUBCUTANEOUS at 10:32

## 2020-12-29 RX ADMIN — Medication 10 ML: at 10:33

## 2020-12-29 RX ADMIN — PIPERACILLIN SODIUM AND TAZOBACTAM SODIUM 2.25 G: 2; .25 INJECTION, POWDER, LYOPHILIZED, FOR SOLUTION INTRAVENOUS at 17:59

## 2020-12-29 RX ADMIN — AZITHROMYCIN 500 MG: 500 INJECTION, POWDER, LYOPHILIZED, FOR SOLUTION INTRAVENOUS at 12:12

## 2020-12-29 NOTE — ED PROVIDER NOTES
EMERGENCY DEPARTMENT HISTORY AND PHYSICAL EXAM      Date: 12/28/2020  Patient Name: Therese Portillo    History of Presenting Illness     Chief Complaint   Patient presents with    Altered mental status       History Provided By: Nursing Home/SNF/Rehab Center    HPI: Therese Portillo, 68 y.o. male presents to the ED with cc of   Chief Complaint   Patient presents with    Altered mental status   Patient with history of dementia sent from the nursing home with fever and altered mental status patient denies any complaint appear confused or demented   Moderate severity, no known exacerbating or relieving factors, no other associated signs and symptoms. There are no other complaints, changes, or physical findings at this time. PCP: Philip Delgado MD    No current facility-administered medications on file prior to encounter. No current outpatient medications on file prior to encounter. Past History     Past Medical History:  Past Medical History:   Diagnosis Date    [facility] ([facility]     CAD (coronary artery disease)     Dementia ([facility]     Psychiatric disorder        Past Surgical History:  History reviewed. No pertinent surgical history. Family History:  History reviewed. No pertinent family history. Social History:  Social History     Tobacco Use    Smoking status: Not on file   Substance Use Topics    Alcohol use: Not Currently    Drug use: Not Currently       Allergies:  No Known Allergies      Review of Systems   Review of Systems   Unable to perform ROS: Dementia       Physical Exam   Physical Exam  Vitals signs and nursing note reviewed. Constitutional:       Appearance: Normal appearance. He is ill-appearing and diaphoretic. HENT:      Head: Normocephalic and atraumatic. Mouth/Throat:      Pharynx: Oropharynx is clear. Eyes:      Extraocular Movements: Extraocular movements intact. Pupils: Pupils are equal, round, and reactive to light.    Neck: Musculoskeletal: Normal range of motion and neck supple. Cardiovascular:      Rate and Rhythm: Regular rhythm. Tachycardia present. Pulses: Normal pulses. Heart sounds: Normal heart sounds. Comments: Chest wall tenderness mild  Pulmonary:      Effort: Respiratory distress (Tachypneic or hyperventilating) present. Abdominal:      General: Abdomen is flat. Bowel sounds are normal.      Palpations: Abdomen is soft. Musculoskeletal: Normal range of motion. Skin:     General: Skin is warm. Neurological:      General: No focal deficit present. Mental Status: He is lethargic and confused. Cranial Nerves: No cranial nerve deficit or dysarthria. Psychiatric:         Mood and Affect: Mood is anxious. Behavior: Behavior normal.         Diagnostic Study Results     Labs -     Recent Results (from the past 12 hour(s))   CBC WITH AUTOMATED DIFF    Collection Time: 12/28/20 11:00 PM   Result Value Ref Range    WBC 8.5 4.1 - 11.1 K/uL    RBC 4.59 4. 10 - 5.70 M/uL    HGB 14.2 12.1 - 17.0 g/dL    HCT 42.1 36.6 - 50.3 %    MCV 91.7 80.0 - 99.0 FL    MCH 30.9 26.0 - 34.0 PG    MCHC 33.7 30.0 - 36.5 g/dL    RDW 12.5 11.5 - 14.5 %    PLATELET 705 784 - 200 K/uL    MPV 9.6 8.9 - 12.9 FL    NEUTROPHILS 80 (H) 32 - 75 %    LYMPHOCYTES 8 (L) 12 - 49 %    MONOCYTES 12 5 - 13 %    EOSINOPHILS 0 0 - 7 %    BASOPHILS 0 0 - 1 %    IMMATURE GRANULOCYTES 0 0.0 - 0.5 %    ABS. NEUTROPHILS 6.7 1.8 - 8.0 K/UL    ABS. LYMPHOCYTES 0.7 (L) 0.8 - 3.5 K/UL    ABS. MONOCYTES 1.0 0.0 - 1.0 K/UL    ABS. EOSINOPHILS 0.0 0.0 - 0.4 K/UL    ABS. BASOPHILS 0.0 0.0 - 0.1 K/UL    ABS. IMM.  GRANS. 0.0 0.00 - 0.04 K/UL    DF AUTOMATED     METABOLIC PANEL, COMPREHENSIVE    Collection Time: 12/28/20 11:00 PM   Result Value Ref Range    Sodium 144 136 - 145 mmol/L    Potassium 4.0 3.5 - 5.1 mmol/L    Chloride 108 97 - 108 mmol/L    CO2 32 21 - 32 mmol/L    Anion gap 4 (L) 5 - 15 mmol/L    Glucose 100 65 - 100 mg/dL    BUN 14 6 - 20 mg/dL    Creatinine 1.47 (H) 0.70 - 1.30 mg/dL    BUN/Creatinine ratio 10 (L) 12 - 20      GFR est AA 56 (L) >60 ml/min/1.73m2    GFR est non-AA 46 (L) >60 ml/min/1.73m2    Calcium 9.1 8.5 - 10.1 mg/dL    Bilirubin, total 0.7 0.2 - 1.0 mg/dL    AST (SGOT) 42 (H) 15 - 37 U/L    ALT (SGPT) 30 12 - 78 U/L    Alk. phosphatase 50 45 - 117 U/L    Protein, total 7.4 6.4 - 8.2 g/dL    Albumin 3.3 (L) 3.5 - 5.0 g/dL    Globulin 4.1 (H) 2.0 - 4.0 g/dL    A-G Ratio 0.8 (L) 1.1 - 2.2     LACTIC ACID    Collection Time: 12/28/20 11:00 PM   Result Value Ref Range    Lactic acid 1.4 0.4 - 2.0 mmol/L       Labs reviewed by me    Radiologic Studies -   CT HEAD WO CONT   Final Result   Impression:   No acute intracranial abnormality. XR CHEST PORT   Final Result   Impression:   No acute cardiopulmonary findings. CT Results  (Last 48 hours)               12/29/20 0033  CT HEAD WO CONT Final result    Impression:  Impression:   No acute intracranial abnormality. Narrative:  Study: Head CT without contrast.       Clinical Indication: Altered mental status. Comparison: Head CT dated 12/5/2020. Technique: Routine volume acquisition of the head was performed without contrast   using soft tissue and bone kernels. Coronal and sagittal reconstructions were   generated and reviewed. Dose reduction: All CT scans at this facility are   performed using dose reduction optimization techniques as appropriate to a   performed exam including the following-automated exposure control, adjustments   of mA and/or Kv according to patient size, or use of iterative reconstructive   technique. Findings:       Generalized volume loss with commensurate prominence of the ventricles and   sulci, unchanged. No midline shift. The basal cisterns are patent. No acute hemorrhage, abnormal   extra-axial fluid, or evidence of large territorial ischemia. Unremarkable orbits. Maxillary sinus mucosal thickening. Scattered left ethmoid   mucosal thickening. No evidence of an aggressive calvarial or extracalvarial   lesion. CXR Results  (Last 48 hours)               12/28/20 2309  XR CHEST PORT Final result    Impression:  Impression:   No acute cardiopulmonary findings. Narrative:  Study: Chest radiograph(s), 1 view       Clinical Indication: Sepsis. Comparison: Chest radiograph dated 12/5/2020. Findings: The cardiac silhouette is normal and unchanged in size. Calcific plaque in the   thoracic aorta. Lung volumes are maintained. No focal consolidation, large pleural effusion, or   discernible pneumothorax. Medical Decision Making     I am the first provider for this patient. I reviewed the vital signs, available nursing notes, past medical history, past surgical history, family history and social history. RADIOLOGY report and LABS reviewed by me    Vital Signs-Reviewed the patient's vital signs. Patient Vitals for the past 12 hrs:   Temp Pulse BP SpO2   12/28/20 2233 99.8 °F (37.7 °C) 87 (!) 158/74 96 %       EKG interpretation: (Preliminary)  EKG done at 2325 shows sinus tachycardia with ventricular rate of 107 normal axis nonspecific ST with no ectopy          Records Reviewed: Nurse's note. Provider Notes (Medical Decision Making):    Patient presents with DIFF DX :         ED Course:   Initial assessment performed. The patients presenting problems have been discussed, and they are in agreement with the care plan formulated and outlined with them. I have encouraged them to ask questions as they arise throughout their visit. TREATMENT RESPONSE -Stable          Jeannie Domingo MD      Disposition:  Admitted   Diagnostic tests were reviewed and questions answered. Diagnosis, care plan and treatment options were discussed. The patient understand instructions and will follow up as directed.   Condition stable    Admitting Provider:  No admitting provider for patient encounter. Consulting Provider:  No ref. provider found       DISCHARGE PLAN:  1. There are no discharge medications for this patient. 2.   Follow-up Information    None       3. Return to ED if worse     Diagnosis     Clinical Impression:     ICD-10-CM ICD-9-CM    1. Fever, unspecified fever cause  R50.9 780.60    2. Altered mental status, unspecified altered mental status type  R41.82 780.97    3. History of dementia  Z86.59 V11.8    4. Suspected COVID-19 virus infection  Z20.828 V01.79         Attestations:    Olga Myers MD    Please note that this dictation was completed with [employer], the [employer] voice recognition software. Quite often unanticipated grammatical, syntax, homophones, and other interpretive errors are inadvertently transcribed by the computer software. Please disregard these errors. Please excuse any errors that have escaped final proofreading. Thank you.

## 2020-12-29 NOTE — H&P
History and Physical    Patient: Jermaine Jhaveri MRN: [id number]  SSN: [id number]    YOB: 1940  Age: 68 y.o. Sex: male      Subjective:      Jermaine Jhaveri is a 68 y.o. male who has a history of CAD, dementia, Alzheimer's and of nursing home was sent to the hospital for altered mental status. Patient had a fever and appeared more confused than baseline mentation. Patient is unable to give history. Patient had periods of agitation in the ED presently has a sitter by the bedside  Past Medical History:   Diagnosis Date    [facility] [address]     CAD (coronary artery disease)     Dementia ([facility]     Psychiatric disorder      History reviewed. No pertinent surgical history. History reviewed. No pertinent family history. Social History     Tobacco Use    Smoking status: Not on file   Substance Use Topics    Alcohol use: Not Currently      Prior to Admission medications    Not on File        No Known Allergies    Review of Systems:  A comprehensive review of systems was negative except for that written in the History of Present Illness. Objective:     Vitals:    12/29/20 0133 12/29/20 0136 12/29/20 0443 12/29/20 0620   BP:   100/68 (!) 179/89   Pulse:  76 64 74   Resp:  24  20   Temp:       SpO2: 96% 96%     Weight:       Height:            Physical Exam:  General:   Patient is nonverbal   Eyes:     Ears:     Nose:    Mouth/Throat: Lips, mucosa, and tongue normal. Teeth and gums normal.   Neck: Supple, symmetrical, trachea midline, no adenopathy, thyroid: no enlargment/tenderness/nodules, no carotid bruit and no JVD. Back:   Symmetric, no curvature. ROM normal. No CVA tenderness. Lungs:   Clear to auscultation bilaterally. Heart:  Regular rate and rhythm, S1, S2 normal, no murmur, click, rub or gallop. Abdomen:   Soft, non-tender. Bowel sounds normal. No masses,  No organomegaly. Extremities: Extremities normal, atraumatic, no cyanosis or edema.    Pulses: 2+ and symmetric all extremities. Skin:    Lymph nodes:    Neurologic:  Nonverbal     Recent Results (from the past 24 hour(s))   CBC WITH AUTOMATED DIFF    Collection Time: 12/28/20 11:00 PM   Result Value Ref Range    WBC 8.5 4.1 - 11.1 K/uL    RBC 4.59 4. 10 - 5.70 M/uL    HGB 14.2 12.1 - 17.0 g/dL    HCT 42.1 36.6 - 50.3 %    MCV 91.7 80.0 - 99.0 FL    MCH 30.9 26.0 - 34.0 PG    MCHC 33.7 30.0 - 36.5 g/dL    RDW 12.5 11.5 - 14.5 %    PLATELET 619 534 - 876 K/uL    MPV 9.6 8.9 - 12.9 FL    NEUTROPHILS 80 (H) 32 - 75 %    LYMPHOCYTES 8 (L) 12 - 49 %    MONOCYTES 12 5 - 13 %    EOSINOPHILS 0 0 - 7 %    BASOPHILS 0 0 - 1 %    IMMATURE GRANULOCYTES 0 0.0 - 0.5 %    ABS. NEUTROPHILS 6.7 1.8 - 8.0 K/UL    ABS. LYMPHOCYTES 0.7 (L) 0.8 - 3.5 K/UL    ABS. MONOCYTES 1.0 0.0 - 1.0 K/UL    ABS. EOSINOPHILS 0.0 0.0 - 0.4 K/UL    ABS. BASOPHILS 0.0 0.0 - 0.1 K/UL    ABS. IMM. GRANS. 0.0 0.00 - 0.04 K/UL    DF AUTOMATED     METABOLIC PANEL, COMPREHENSIVE    Collection Time: 12/28/20 11:00 PM   Result Value Ref Range    Sodium 144 136 - 145 mmol/L    Potassium 4.0 3.5 - 5.1 mmol/L    Chloride 108 97 - 108 mmol/L    CO2 32 21 - 32 mmol/L    Anion gap 4 (L) 5 - 15 mmol/L    Glucose 100 65 - 100 mg/dL    BUN 14 6 - 20 mg/dL    Creatinine 1.47 (H) 0.70 - 1.30 mg/dL    BUN/Creatinine ratio 10 (L) 12 - 20      GFR est AA 56 (L) >60 ml/min/1.73m2    GFR est non-AA 46 (L) >60 ml/min/1.73m2    Calcium 9.1 8.5 - 10.1 mg/dL    Bilirubin, total 0.7 0.2 - 1.0 mg/dL    AST (SGOT) 42 (H) 15 - 37 U/L    ALT (SGPT) 30 12 - 78 U/L    Alk.  phosphatase 50 45 - 117 U/L    Protein, total 7.4 6.4 - 8.2 g/dL    Albumin 3.3 (L) 3.5 - 5.0 g/dL    Globulin 4.1 (H) 2.0 - 4.0 g/dL    A-G Ratio 0.8 (L) 1.1 - 2.2     LACTIC ACID    Collection Time: 12/28/20 11:00 PM   Result Value Ref Range    Lactic acid 1.4 0.4 - 2.0 mmol/L   EKG, 12 LEAD, INITIAL    Collection Time: 12/28/20 11:25 PM   Result Value Ref Range    Ventricular Rate 107 BPM    Atrial Rate 107 BPM P-R Interval 160 ms    QRS Duration 76 ms    Q-T Interval 334 ms    QTC Calculation (Bezet) 445 ms    Calculated P Axis 47 degrees    Calculated R Axis 24 degrees    Calculated T Axis 54 degrees    Diagnosis       Sinus tachycardia  Nonspecific ST abnormality  Abnormal ECG  When compared with ECG of 05-DEC-2020 12:16,  No significant change was found  Confirmed by Mary Lou Stallings ([id number]) on 12/29/2020 7:02:23 AM     SARS-COV-2    Collection Time: 12/29/20  1:27 AM   Result Value Ref Range    SARS-CoV-2 Please find results under separate order     COVID-19 RAPID TEST    Collection Time: 12/29/20  1:27 AM   Result Value Ref Range    Specimen source Nasopharyngeal      COVID-19 rapid test Not Detected Not Detected         Assessment:     Hospital Problems  Never Reviewed          Codes Class Noted POA    Fever ICD-10-CM: R50.9  ICD-9-CM: 780.60  12/29/2020 Unknown        AMS (altered mental status) ICD-10-CM: R41.82  ICD-9-CM: 780.97  12/29/2020 Unknown        Encephalopathy acute ICD-10-CM: G93.40  ICD-9-CM: 348.30  12/29/2020 Unknown            Acute encephalopathy etiology unclear possible COVID-19 encephalopathy  Fever unknown cause possible UTI  Dementia  CAD  Plan:     Urine analysis and urine culture, cardiac enzymes, COVID-19 test  Critical care time of 60 minutes    Signed By: Virgie Salas MD     December 29, 2020

## 2020-12-29 NOTE — ED NOTES
Took over care from loop of patient at this time. [facility] [address]. History of dementia. To be admitted for sepsis. Pt uncooperative and unable to direct, attempting to get out of bed in room. Pt will not keep any monitoring on. IV sites wrapped with coban.  Will monitor

## 2020-12-30 LAB
AMMONIA PLAS-SCNC: <10 UMOL/L
ANION GAP SERPL CALC-SCNC: 5 MMOL/L (ref 5–15)
BACTERIA SPEC CULT: NORMAL
BUN SERPL-MCNC: 15 MG/DL (ref 6–20)
BUN/CREAT SERPL: 13 (ref 12–20)
CA-I BLD-MCNC: 8.1 MG/DL (ref 8.5–10.1)
CHLORIDE SERPL-SCNC: 107 MMOL/L (ref 97–108)
CO2 SERPL-SCNC: 27 MMOL/L (ref 21–32)
CREAT SERPL-MCNC: 1.19 MG/DL (ref 0.7–1.3)
GLUCOSE SERPL-MCNC: 205 MG/DL (ref 65–100)
POTASSIUM SERPL-SCNC: 3.3 MMOL/L (ref 3.5–5.1)
SODIUM SERPL-SCNC: 139 MMOL/L (ref 136–145)
SPECIAL REQUESTS,SREQ: NORMAL
TROPONIN I SERPL-MCNC: <0.05 NG/ML

## 2020-12-30 PROCEDURE — 74011250636 HC RX REV CODE- 250/636: Performed by: INTERNAL MEDICINE

## 2020-12-30 PROCEDURE — 80048 BASIC METABOLIC PNL TOTAL CA: CPT

## 2020-12-30 PROCEDURE — 74011250637 HC RX REV CODE- 250/637: Performed by: INTERNAL MEDICINE

## 2020-12-30 PROCEDURE — 74011000258 HC RX REV CODE- 258: Performed by: INTERNAL MEDICINE

## 2020-12-30 PROCEDURE — 82140 ASSAY OF AMMONIA: CPT

## 2020-12-30 PROCEDURE — 65270000029 HC RM PRIVATE

## 2020-12-30 PROCEDURE — 36415 COLL VENOUS BLD VENIPUNCTURE: CPT

## 2020-12-30 PROCEDURE — 84484 ASSAY OF TROPONIN QUANT: CPT

## 2020-12-30 RX ORDER — FACIAL-BODY WIPES
10 EACH TOPICAL DAILY PRN
Status: DISCONTINUED | OUTPATIENT
Start: 2020-12-30 | End: 2021-01-26 | Stop reason: HOSPADM

## 2020-12-30 RX ORDER — HALOPERIDOL 5 MG/ML
2.5 INJECTION INTRAMUSCULAR
Status: DISCONTINUED | OUTPATIENT
Start: 2020-12-30 | End: 2020-12-30

## 2020-12-30 RX ORDER — HALOPERIDOL 5 MG/ML
1 INJECTION INTRAMUSCULAR
Status: DISCONTINUED | OUTPATIENT
Start: 2020-12-30 | End: 2021-01-26 | Stop reason: HOSPADM

## 2020-12-30 RX ORDER — SODIUM CHLORIDE 0.9 % (FLUSH) 0.9 %
5-40 SYRINGE (ML) INJECTION AS NEEDED
Status: DISCONTINUED | OUTPATIENT
Start: 2020-12-30 | End: 2021-01-26 | Stop reason: HOSPADM

## 2020-12-30 RX ORDER — SODIUM CHLORIDE 0.9 % (FLUSH) 0.9 %
5-40 SYRINGE (ML) INJECTION EVERY 8 HOURS
Status: DISCONTINUED | OUTPATIENT
Start: 2020-12-30 | End: 2020-12-30

## 2020-12-30 RX ORDER — DILTIAZEM HYDROCHLORIDE 60 MG/1
60 CAPSULE, EXTENDED RELEASE ORAL EVERY 12 HOURS
Status: DISCONTINUED | OUTPATIENT
Start: 2020-12-30 | End: 2021-01-04

## 2020-12-30 RX ADMIN — ENOXAPARIN SODIUM 40 MG: 40 INJECTION SUBCUTANEOUS at 09:40

## 2020-12-30 RX ADMIN — PIPERACILLIN SODIUM AND TAZOBACTAM SODIUM 2.25 G: 2; .25 INJECTION, POWDER, LYOPHILIZED, FOR SOLUTION INTRAVENOUS at 17:40

## 2020-12-30 RX ADMIN — HALOPERIDOL LACTATE 1 MG: 5 INJECTION, SOLUTION INTRAMUSCULAR at 20:55

## 2020-12-30 RX ADMIN — DILTIAZEM HYDROCHLORIDE 60 MG: 60 CAPSULE, EXTENDED RELEASE ORAL at 09:40

## 2020-12-30 RX ADMIN — DEXTROSE AND SODIUM CHLORIDE 75 ML/HR: 5; 450 INJECTION, SOLUTION INTRAVENOUS at 15:30

## 2020-12-30 RX ADMIN — PIPERACILLIN SODIUM AND TAZOBACTAM SODIUM 2.25 G: 2; .25 INJECTION, POWDER, LYOPHILIZED, FOR SOLUTION INTRAVENOUS at 10:55

## 2020-12-30 RX ADMIN — AZITHROMYCIN 500 MG: 500 INJECTION, POWDER, LYOPHILIZED, FOR SOLUTION INTRAVENOUS at 09:40

## 2020-12-30 RX ADMIN — PIPERACILLIN SODIUM AND TAZOBACTAM SODIUM 2.25 G: 2; .25 INJECTION, POWDER, LYOPHILIZED, FOR SOLUTION INTRAVENOUS at 00:21

## 2020-12-30 RX ADMIN — DILTIAZEM HYDROCHLORIDE 60 MG: 60 CAPSULE, EXTENDED RELEASE ORAL at 20:55

## 2020-12-30 NOTE — PROGRESS NOTES
Skin assessment done with Paige (RN). Patient has scattered bruising to lower extremities with localized erythema to distal lower limbs, ankles and feet. Localized bruising noted to right hip. No open areas noted.

## 2020-12-30 NOTE — ED NOTES
Patient report called to [facility], patient has telemetry box applied, patient in stable position, transferred to floor with technician via stretcher

## 2020-12-30 NOTE — PROGRESS NOTES
Progress Note    Patient: Zahra Gandhi MRN: [id number]  SSN: [id number]    YOB: 1940  Age: 68 y.o. Sex: male      Admit Date: 12/28/2020    LOS: 1 day     Subjective:   68years old patient who has a history of carcinoma of the prostate dementia was brought to the hospital for altered mental status. Patient is COVID-19 negative by rapid test.  Has been up and about up until the day he was brought to the hospital.  Patient is unable to give history. Further history was obtained from the wife    Objective:     Vitals:    12/29/20 2141 12/30/20 0010 12/30/20 0814 12/30/20 1532   BP: (!) 153/93 (!) 152/88 (!) 156/82 120/87   Pulse: (!) 109 (!) 108 79 78   Resp: 20 20 19 20   Temp: 98.1 °F (36.7 °C) 98.9 °F (37.2 °C) 98.2 °F (36.8 °C) 97.1 °F (36.2 °C)   SpO2: 98% 100% 100% 100%   Weight:       Height:            Intake and Output:  Current Shift: No intake/output data recorded. Last three shifts: 12/28 1901 - 12/30 0700  In: 1100 [I.V.:1100]  Out: -     Physical Exam:   General:   Patient is not alert, drowsy   Eyes:     Ears:     Nose:    Mouth/Throat: Lips, mucosa, and tongue normal. Teeth and gums normal.   Neck: Supple, symmetrical, trachea midline, no adenopathy, thyroid: no enlargment/tenderness/nodules, no carotid bruit and no JVD. Back:   Symmetric, no curvature. ROM normal. No CVA tenderness. Lungs:   Clear to auscultation bilaterally. Heart:  Regular rate and rhythm, S1, S2 normal, no murmur, click, rub or gallop. Abdomen:   Soft, non-tender. Bowel sounds normal. No masses,  No organomegaly. Extremities:  Patient is in restraints. Pulses: 2+ and symmetric all extremities. Skin: Skin color, texture, turgor normal. No rashes or lesions   Lymph nodes: Cervical, supraclavicular, and axillary nodes normal.   Neurologic:  Drowsy. Lab/Data Review: All lab results for the last 24 hours reviewed.      Recent Results (from the past 24 hour(s))   METABOLIC PANEL, BASIC Collection Time: 12/30/20  3:51 AM   Result Value Ref Range    Sodium 139 136 - 145 mmol/L    Potassium 3.3 (L) 3.5 - 5.1 mmol/L    Chloride 107 97 - 108 mmol/L    CO2 27 21 - 32 mmol/L    Anion gap 5 5 - 15 mmol/L    Glucose 205 (H) 65 - 100 mg/dL    BUN 15 6 - 20 mg/dL    Creatinine 1.19 0.70 - 1.30 mg/dL    BUN/Creatinine ratio 13 12 - 20      GFR est AA >60 >60 ml/min/1.73m2    GFR est non-AA 59 (L) >60 ml/min/1.73m2    Calcium 8.1 (L) 8.5 - 10.1 mg/dL         Assessment:     Active Problems:    Fever (12/29/2020)      AMS (altered mental status) (12/29/2020)      Encephalopathy acute (12/29/2020)    Possible aspiration pneumonia patient is on IV Zosyn will discontinue erythromycin  Encephalopathy acute consult neurology obtain EEG  Sepsis end-stage dementia with agitation  Hypertension accelerated  Doubts cardiac arrhythmias    Plan:     Discussed with primary caretaker and wife.   Patient has end-stage dementia with agitation at home and wandering    Signed By: Chaitanya Moon MD     December 30, 2020

## 2020-12-30 NOTE — ED NOTES
Bedside shift change report given to Clara Polk (oncoming nurse) by Liliana Kidd RN (offgoing nurse). Report included the following information SBAR, Kardex, MAR and Recent Results.

## 2020-12-31 PROCEDURE — 65270000029 HC RM PRIVATE

## 2020-12-31 PROCEDURE — 74011250636 HC RX REV CODE- 250/636: Performed by: INTERNAL MEDICINE

## 2020-12-31 PROCEDURE — 74011250637 HC RX REV CODE- 250/637: Performed by: INTERNAL MEDICINE

## 2020-12-31 PROCEDURE — 74011000258 HC RX REV CODE- 258: Performed by: INTERNAL MEDICINE

## 2020-12-31 RX ADMIN — PIPERACILLIN SODIUM AND TAZOBACTAM SODIUM 2.25 G: 2; .25 INJECTION, POWDER, LYOPHILIZED, FOR SOLUTION INTRAVENOUS at 17:37

## 2020-12-31 RX ADMIN — DILTIAZEM HYDROCHLORIDE 60 MG: 60 CAPSULE, EXTENDED RELEASE ORAL at 21:30

## 2020-12-31 RX ADMIN — HALOPERIDOL LACTATE 1 MG: 5 INJECTION, SOLUTION INTRAMUSCULAR at 14:32

## 2020-12-31 RX ADMIN — PIPERACILLIN SODIUM AND TAZOBACTAM SODIUM 2.25 G: 2; .25 INJECTION, POWDER, LYOPHILIZED, FOR SOLUTION INTRAVENOUS at 09:13

## 2020-12-31 RX ADMIN — HALOPERIDOL LACTATE 1 MG: 5 INJECTION, SOLUTION INTRAMUSCULAR at 22:02

## 2020-12-31 RX ADMIN — DEXTROSE AND SODIUM CHLORIDE 75 ML/HR: 5; 450 INJECTION, SOLUTION INTRAVENOUS at 21:31

## 2020-12-31 RX ADMIN — DILTIAZEM HYDROCHLORIDE 60 MG: 60 CAPSULE, EXTENDED RELEASE ORAL at 09:16

## 2020-12-31 RX ADMIN — PIPERACILLIN SODIUM AND TAZOBACTAM SODIUM 2.25 G: 2; .25 INJECTION, POWDER, LYOPHILIZED, FOR SOLUTION INTRAVENOUS at 02:07

## 2020-12-31 RX ADMIN — ENOXAPARIN SODIUM 40 MG: 40 INJECTION SUBCUTANEOUS at 09:16

## 2020-12-31 RX ADMIN — DEXTROSE AND SODIUM CHLORIDE 75 ML/HR: 5; 450 INJECTION, SOLUTION INTRAVENOUS at 02:07

## 2020-12-31 NOTE — PROGRESS NOTES
MADDIE received telephone call from spouse Farrah Hamilton [phone]. She stated that she cannot take care of the patient at home currently and would like him to go back to [place] [facility] [id number]. SNF at discharge as first choice. She also stated if he cannot get back into [facility] then any SNF in the area would be okay. She states she has already paid [facility] [address]. for the month of Jan 2021. MADDIE sent referral, pending acceptance at this time.

## 2020-12-31 NOTE — CONSULTS
Consult Date: 12/31/2020    Consults Mr. Josefina Zambrano is a 68year old man with dementia from NH who was sent to the hospital for non specific AMS. Labs, UA, Ct head are all unrevealing except hyperglycemia in 200s. On exam today he is awake and alert, but disoriented and restrained. All history obtained from chart as he cannot provide much history. Subjective     Past Medical History:   Diagnosis Date    [facility] ([facility] [address]     CAD (coronary artery disease)     Dementia ([facility]     Psychiatric disorder       History reviewed. No pertinent surgical history. History reviewed. No pertinent family history.    Social History     Tobacco Use    Smoking status: Not on file   Substance Use Topics    Alcohol use: Not Currently       Current Facility-Administered Medications   Medication Dose Route Frequency Provider Last Rate Last Admin    dilTIAZem ER (CARDIZEM SR) capsule 60 mg  60 mg Oral Q12H Sarah SUTHERLAND MD   60 mg at 12/30/20 2055    haloperidol lactate (HALDOL) injection 1 mg  1 mg IntraMUSCular TID PRN Sarah SUTHERLAND MD   1 mg at 12/30/20 2055    sodium chloride (NS) flush 5-40 mL  5-40 mL IntraVENous PRN Rosalinda Cabral MD        bisacodyL (DULCOLAX) suppository 10 mg  10 mg Rectal DAILY PRN Rosalinda Cabral MD        sodium chloride (NS) flush 5-40 mL  5-40 mL IntraVENous PRN Rosalinda Cabral MD        acetaminophen (TYLENOL) tablet 650 mg  650 mg Oral Q6H PRN Sarah SUTHERLAND MD        Or   [facility] acetaminophen (TYLENOL) suppository 650 mg  650 mg Rectal Q6H PRN Rosalinda Cabral MD        polyethylene glycol (MIRALAX) packet 17 g  17 g Oral DAILY PRN Rosalinda Cabral MD        promethazine (PHENERGAN) tablet 12.5 mg  12.5 mg Oral Q6H PRN Sarah SUTHERLAND MD        Or    ondansetron (ZOFRAN) injection 4 mg  4 mg IntraVENous Q6H PRN Sarah SUTHERLAND MD        enoxaparin (LOVENOX) injection 40 mg  40 mg SubCUTAneous DAILY Sarah SUTHERLAND MD   40 mg at 12/30/20 0940    piperacillin-tazobactam (ZOSYN) 2.25 g in 0.9% sodium chloride (MBP/ADV) 50 mL MBP  2.25 g IntraVENous Q8H Narendra Smith MD 12.5 mL/hr at 12/31/20 0207 2.25 g at 12/31/20 0207    dextrose 5 % - 0.45% NaCl infusion  75 mL/hr IntraVENous CONTINUOUS Arti SUTHERLAND MD 75 mL/hr at 12/31/20 0207 75 mL/hr at 12/31/20 0207        Review of Systems   Unable to perform ROS: Dementia       Objective     Vital signs for last 24 hours:  Visit Vitals  BP (!) 151/83 (BP 1 Location: Left arm, BP Patient Position: At rest)   Pulse 78   Temp 97.9 °F (36.6 °C)   Resp 18   Ht 5' 10\" (1.778 m)   Wt 124.3 kg (274 lb)   SpO2 100%   BMI 39.31 kg/m²       Intake/Output this shift:  Current Shift: No intake/output data recorded. Last 3 Shifts: 12/29 1901 - 12/31 0700  In: 5955 [P.O.:720; I.V.:900]  Out: 1200 [Urine:1200]    Data Review:   Recent Results (from the past 24 hour(s))   TROPONIN I    Collection Time: 12/30/20  6:50 PM   Result Value Ref Range    Troponin-I, Qt. <0.05 <0.05 ng/mL   AMMONIA    Collection Time: 12/30/20  6:50 PM   Result Value Ref Range    Ammonia <10 <32 umol/L       Physical Exam    Neuro Physical Exam      General: Well developed, well nourished. Patient in no apparent distress. Neurological Exam:  Mental Status: Awake, alert, oriented to self only    Cranial Nerves:   Intact visual fields. PERRL, EOM's full, no nystagmus, no ptosis. Facial sensation is normal. Facial movement is symmetric. Palate is midline. Normal sternocleidomastoid strength. Tongue is midline. Hearing is intact bilaterally. Motor:  5/5 strength in upper and lower proximal and distal muscles. Normal bulk and tone. Reflexes:   Deep tendon reflexes 2+/4 and symmetrical.   Sensory:   Normal to light touch   Gait:  Not tested    Tremor:   No tremor noted. Cerebellar:  No cerebellar signs present.    Babinski:              Down b/l     Assessment and Plan: Mr. Patricia Hampton is a 68year old man from NH with dementia who comes in with AMS. I am not sure what the change in mental status was from baseline. If he is agitated start olanzapine 5mg bid. No further neurological workup.

## 2020-12-31 NOTE — PROGRESS NOTES
Progress Note    Patient: Letitia Bond MRN: [id number]  SSN: [id number]    YOB: 1940  Age: 68 y.o. Sex: male      Admit Date: 12/28/2020    LOS: 2 days     Subjective:   68years old patient who has a history of carcinoma of the prostate dementia was brought to the hospital for altered mental status. Unable to get history from patient because of altered mental status    Objective:     Vitals:    12/31/20 0000 12/31/20 0400 12/31/20 0814 12/31/20 1200   BP: (!) 145/83  (!) 151/83    Pulse: 74 80 78 88   Resp: 16  18    Temp: 98.2 °F (36.8 °C)  97.9 °F (36.6 °C)    SpO2: 100%  100%    Weight:       Height:            Intake and Output:  Current Shift: No intake/output data recorded. Last three shifts: 12/29 1901 - 12/31 0700  In: 7363 [P.O.:720; I.V.:900]  Out: 1200 [Urine:1200]    Physical Exam:   General:   Patient is not alert, drowsy   Eyes:     Ears:     Nose:    Mouth/Throat: Lips, mucosa, and tongue normal. Teeth and gums normal.   Neck: Supple, symmetrical, trachea midline, no adenopathy, thyroid: no enlargment/tenderness/nodules, no carotid bruit and no JVD. Back:   Symmetric, no curvature. ROM normal. No CVA tenderness. Lungs:   Clear to auscultation bilaterally. Heart:  Regular rate and rhythm, S1, S2 normal, no murmur, click, rub or gallop. Abdomen:   Soft, non-tender. Bowel sounds normal. No masses,  No organomegaly. Extremities:  Patient is in restraints. Pulses: 2+ and symmetric all extremities. Skin: Skin color, texture, turgor normal. No rashes or lesions   Lymph nodes: Cervical, supraclavicular, and axillary nodes normal.   Neurologic:  Drowsy. Lab/Data Review: All lab results for the last 24 hours reviewed.      Recent Results (from the past 24 hour(s))   TROPONIN I    Collection Time: 12/30/20  6:50 PM   Result Value Ref Range    Troponin-I, Qt. <0.05 <0.05 ng/mL   AMMONIA    Collection Time: 12/30/20  6:50 PM   Result Value Ref Range    Ammonia <10 <32 umol/L         Assessment:     Active Problems:    Fever (12/29/2020)      AMS (altered mental status) (12/29/2020)      Encephalopathy acute (12/29/2020)    Possible aspiration pneumonia patient is on IV Zosyn will discontinue erythromycin  Encephalopathy acute consult neurology obtain EEG  Sepsis end-stage dementia with agitation  Hypertension accelerated  Doubts cardiac arrhythmias    Plan:     Patient appears to have end-stage dementia with occasional agitation  Need to monitor mentation and agitation    Signed By: Kaylynn Ferris MD     December 31, 2020

## 2020-12-31 NOTE — PROGRESS NOTES
Problem: Non-Violent Restraints  Goal: *No harm/injury to patient while restraints in use  Outcome: Progressing Towards Goal     Problem: Falls - Risk of  Goal: *Absence of Falls  Description: Document Shabana Minium Fall Risk and appropriate interventions in the flowsheet. Outcome: Progressing Towards Goal  Note: Fall Risk Interventions:  Mobility Interventions: Bed/chair exit alarm, Patient to call before getting OOB    Mentation Interventions: Bed/chair exit alarm, Adequate sleep, hydration, pain control, Room close to nurse's station, Toileting rounds    Medication Interventions: Bed/chair exit alarm    Elimination Interventions: Bed/chair exit alarm, Toilet paper/wipes in reach, Toileting schedule/hourly rounds    History of Falls Interventions: Bed/chair exit alarm, Room close to nurse's station         Problem: Pressure Injury - Risk of  Goal: *Prevention of pressure injury  Description: Document Carlyle Scale and appropriate interventions in the flowsheet. Outcome: Progressing Towards Goal  Note: Pressure Injury Interventions:  Sensory Interventions: Assess changes in LOC, Discuss PT/OT consult with provider, Turn and reposition approx. every two hours (pillows and wedges if needed), Monitor skin under medical devices, Minimize linen layers, Maintain/enhance activity level, Keep linens dry and wrinkle-free, Float heels    Moisture Interventions: Absorbent underpads, Apply protective barrier, creams and emollients, Moisture barrier, Minimize layers, Maintain skin hydration (lotion/cream), Internal/External urinary devices    Activity Interventions: PT/OT evaluation    Mobility Interventions: Float heels, HOB 30 degrees or less, PT/OT evaluation, Turn and reposition approx.  every two hours(pillow and wedges)    Nutrition Interventions: Document food/fluid/supplement intake, Offer support with meals,snacks and hydration    Friction and Shear Interventions: Apply protective barrier, creams and emollients, Feet elevated on foot rest, Foam dressings/transparent film/skin sealants, HOB 30 degrees or less, Transferring/repositioning devices

## 2020-12-31 NOTE — PROGRESS NOTES
CM met with patient. Patient is currently in bilateral soft wrist restraints and pleasantly confused. CM attempted to perform assessment. CM called spouse Mario Yee [phone] no answer to telephone, CM left detailed message requesting return cellular telephone call to complete assessment.

## 2021-01-01 LAB
ANION GAP SERPL CALC-SCNC: 6 MMOL/L (ref 5–15)
BASOPHILS # BLD: 0 K/UL (ref 0–0.1)
BASOPHILS NFR BLD: 0 % (ref 0–1)
BUN SERPL-MCNC: 12 MG/DL (ref 6–20)
BUN/CREAT SERPL: 10 (ref 12–20)
CA-I BLD-MCNC: 8.6 MG/DL (ref 8.5–10.1)
CHLORIDE SERPL-SCNC: 108 MMOL/L (ref 97–108)
CO2 SERPL-SCNC: 26 MMOL/L (ref 21–32)
CREAT SERPL-MCNC: 1.19 MG/DL (ref 0.7–1.3)
DIFFERENTIAL METHOD BLD: NORMAL
EOSINOPHIL # BLD: 0.3 K/UL (ref 0–0.4)
EOSINOPHIL NFR BLD: 6 % (ref 0–7)
ERYTHROCYTE [DISTWIDTH] IN BLOOD BY AUTOMATED COUNT: 12.8 % (ref 11.5–14.5)
GLUCOSE SERPL-MCNC: 85 MG/DL (ref 65–100)
HCT VFR BLD AUTO: 38 % (ref 36.6–50.3)
HGB BLD-MCNC: 13.1 G/DL (ref 12.1–17)
IMM GRANULOCYTES # BLD AUTO: 0 K/UL (ref 0–0.04)
IMM GRANULOCYTES NFR BLD AUTO: 0 % (ref 0–0.5)
LYMPHOCYTES # BLD: 1 K/UL (ref 0.8–3.5)
LYMPHOCYTES NFR BLD: 19 % (ref 12–49)
MCH RBC QN AUTO: 30.4 PG (ref 26–34)
MCHC RBC AUTO-ENTMCNC: 34.5 G/DL (ref 30–36.5)
MCV RBC AUTO: 88.2 FL (ref 80–99)
MONOCYTES # BLD: 0.6 K/UL (ref 0–1)
MONOCYTES NFR BLD: 12 % (ref 5–13)
NEUTS SEG # BLD: 3.1 K/UL (ref 1.8–8)
NEUTS SEG NFR BLD: 63 % (ref 32–75)
PLATELET # BLD AUTO: 246 K/UL (ref 150–400)
PMV BLD AUTO: 9.6 FL (ref 8.9–12.9)
POTASSIUM SERPL-SCNC: 3.4 MMOL/L (ref 3.5–5.1)
RBC # BLD AUTO: 4.31 M/UL (ref 4.1–5.7)
SODIUM SERPL-SCNC: 140 MMOL/L (ref 136–145)
WBC # BLD AUTO: 4.9 K/UL (ref 4.1–11.1)

## 2021-01-01 PROCEDURE — 74011250637 HC RX REV CODE- 250/637: Performed by: INTERNAL MEDICINE

## 2021-01-01 PROCEDURE — 65270000029 HC RM PRIVATE

## 2021-01-01 PROCEDURE — 74011000258 HC RX REV CODE- 258: Performed by: INTERNAL MEDICINE

## 2021-01-01 PROCEDURE — 36415 COLL VENOUS BLD VENIPUNCTURE: CPT

## 2021-01-01 PROCEDURE — 80048 BASIC METABOLIC PNL TOTAL CA: CPT

## 2021-01-01 PROCEDURE — 74011250636 HC RX REV CODE- 250/636: Performed by: INTERNAL MEDICINE

## 2021-01-01 PROCEDURE — 85025 COMPLETE CBC W/AUTO DIFF WBC: CPT

## 2021-01-01 RX ADMIN — PIPERACILLIN SODIUM AND TAZOBACTAM SODIUM 2.25 G: 2; .25 INJECTION, POWDER, LYOPHILIZED, FOR SOLUTION INTRAVENOUS at 00:20

## 2021-01-01 RX ADMIN — ENOXAPARIN SODIUM 40 MG: 40 INJECTION SUBCUTANEOUS at 09:57

## 2021-01-01 RX ADMIN — DILTIAZEM HYDROCHLORIDE 60 MG: 60 CAPSULE, EXTENDED RELEASE ORAL at 09:57

## 2021-01-01 RX ADMIN — HALOPERIDOL LACTATE 1 MG: 5 INJECTION, SOLUTION INTRAMUSCULAR at 09:57

## 2021-01-01 RX ADMIN — PIPERACILLIN SODIUM AND TAZOBACTAM SODIUM 2.25 G: 2; .25 INJECTION, POWDER, LYOPHILIZED, FOR SOLUTION INTRAVENOUS at 09:57

## 2021-01-01 RX ADMIN — DILTIAZEM HYDROCHLORIDE 60 MG: 60 CAPSULE, EXTENDED RELEASE ORAL at 20:00

## 2021-01-01 RX ADMIN — PIPERACILLIN SODIUM AND TAZOBACTAM SODIUM 2.25 G: 2; .25 INJECTION, POWDER, LYOPHILIZED, FOR SOLUTION INTRAVENOUS at 16:43

## 2021-01-01 NOTE — PROGRESS NOTES
Progress Note    Patient: rBe Laguerre MRN: [id number]  SSN: [id number]    YOB: 1940  Age: 68 y.o. Sex: male      Admit Date: 12/28/2020    LOS: 3 days     Subjective:     Patient is more alert and maintains a conversation. Still in restraints    Objective:     Vitals:    01/01/21 0000 01/01/21 0400 01/01/21 0523 01/01/21 0800   BP:   (!) 163/96 (!) 155/81   Pulse: 87 94 94 77   Resp:   18 18   Temp:   98.3 °F (36.8 °C) 98.2 °F (36.8 °C)   SpO2:   97% 96%   Weight:       Height:            Intake and Output:  Current Shift: No intake/output data recorded. Last three shifts: 12/30 1901 - 01/01 0700  In: 1860 [P.O.:960; I.V.:900]  Out: 1200 [Urine:1200]    Physical Exam:   General:  Alert, cooperative, no distress, appears stated age. Eyes:  Conjunctivae/corneas clear. PERRL, EOMs intact. Fundi benign   Ears:  Normal TMs and external ear canals both ears. Nose: Nares normal. Septum midline. Mucosa normal. No drainage or sinus tenderness. Mouth/Throat: Lips, mucosa, and tongue normal. Teeth and gums normal.   Neck: Supple, symmetrical, trachea midline, no adenopathy, thyroid: no enlargment/tenderness/nodules, no carotid bruit and no JVD. Back:   Symmetric, no curvature. ROM normal. No CVA tenderness. Lungs:   Clear to auscultation bilaterally. Heart:  Regular rate and rhythm, S1, S2 normal, no murmur, click, rub or gallop. Abdomen:   Soft, non-tender. Bowel sounds normal. No masses,  No organomegaly. Extremities: Extremities normal, atraumatic, no cyanosis or edema. Pulses: 2+ and symmetric all extremities. Skin: Skin color, texture, turgor normal. No rashes or lesions   Lymph nodes: Cervical, supraclavicular, and axillary nodes normal.   Neurologic: CNII-XII intact. Normal strength, sensation and reflexes throughout. Lab/Data Review: All lab results for the last 24 hours reviewed.      Recent Results (from the past 24 hour(s))   CBC WITH AUTOMATED DIFF Collection Time: 01/01/21  7:10 AM   Result Value Ref Range    WBC 4.9 4.1 - 11.1 K/uL    RBC 4.31 4.10 - 5.70 M/uL    HGB 13.1 12.1 - 17.0 g/dL    HCT 38.0 36.6 - 50.3 %    MCV 88.2 80.0 - 99.0 FL    MCH 30.4 26.0 - 34.0 PG    MCHC 34.5 30.0 - 36.5 g/dL    RDW 12.8 11.5 - 14.5 %    PLATELET 656 841 - 253 K/uL    MPV 9.6 8.9 - 12.9 FL    NEUTROPHILS 63 32 - 75 %    LYMPHOCYTES 19 12 - 49 %    MONOCYTES 12 5 - 13 %    EOSINOPHILS 6 0 - 7 %    BASOPHILS 0 0 - 1 %    IMMATURE GRANULOCYTES 0 0.0 - 0.5 %    ABS. NEUTROPHILS 3.1 1.8 - 8.0 K/UL    ABS. LYMPHOCYTES 1.0 0.8 - 3.5 K/UL    ABS. MONOCYTES 0.6 0.0 - 1.0 K/UL    ABS. EOSINOPHILS 0.3 0.0 - 0.4 K/UL    ABS. BASOPHILS 0.0 0.0 - 0.1 K/UL    ABS. IMM.  GRANS. 0.0 0.00 - 0.04 K/UL    DF AUTOMATED           Assessment:     Active Problems:    Fever (12/29/2020)      AMS (altered mental status) (12/29/2020)      Encephalopathy acute (12/29/2020)    With aspiration pneumonia patient on IV antibiotics  Dementia severe and end-stage      Plan:     Continue with IV antibiotics may begin PT and OT    Signed By: Teresa Allen MD     January 1, 2021

## 2021-01-02 PROCEDURE — 97530 THERAPEUTIC ACTIVITIES: CPT | Performed by: PHYSICAL THERAPIST

## 2021-01-02 PROCEDURE — 74011000258 HC RX REV CODE- 258: Performed by: INTERNAL MEDICINE

## 2021-01-02 PROCEDURE — 65270000029 HC RM PRIVATE

## 2021-01-02 PROCEDURE — 74011250637 HC RX REV CODE- 250/637: Performed by: NURSE PRACTITIONER

## 2021-01-02 PROCEDURE — 74011250637 HC RX REV CODE- 250/637: Performed by: INTERNAL MEDICINE

## 2021-01-02 PROCEDURE — 97161 PT EVAL LOW COMPLEX 20 MIN: CPT | Performed by: PHYSICAL THERAPIST

## 2021-01-02 PROCEDURE — 74011250636 HC RX REV CODE- 250/636: Performed by: INTERNAL MEDICINE

## 2021-01-02 RX ORDER — POTASSIUM CHLORIDE 20 MEQ/1
20 TABLET, EXTENDED RELEASE ORAL
Status: COMPLETED | OUTPATIENT
Start: 2021-01-02 | End: 2021-01-02

## 2021-01-02 RX ADMIN — PIPERACILLIN SODIUM AND TAZOBACTAM SODIUM 2.25 G: 2; .25 INJECTION, POWDER, LYOPHILIZED, FOR SOLUTION INTRAVENOUS at 09:55

## 2021-01-02 RX ADMIN — DEXTROSE AND SODIUM CHLORIDE 75 ML/HR: 5; 450 INJECTION, SOLUTION INTRAVENOUS at 03:19

## 2021-01-02 RX ADMIN — DILTIAZEM HYDROCHLORIDE 60 MG: 60 CAPSULE, EXTENDED RELEASE ORAL at 09:55

## 2021-01-02 RX ADMIN — POTASSIUM CHLORIDE 20 MEQ: 1500 TABLET, EXTENDED RELEASE ORAL at 21:43

## 2021-01-02 RX ADMIN — DEXTROSE AND SODIUM CHLORIDE 75 ML/HR: 5; 450 INJECTION, SOLUTION INTRAVENOUS at 19:22

## 2021-01-02 RX ADMIN — ENOXAPARIN SODIUM 40 MG: 40 INJECTION SUBCUTANEOUS at 09:55

## 2021-01-02 RX ADMIN — PIPERACILLIN SODIUM AND TAZOBACTAM SODIUM 2.25 G: 2; .25 INJECTION, POWDER, LYOPHILIZED, FOR SOLUTION INTRAVENOUS at 19:22

## 2021-01-02 RX ADMIN — PIPERACILLIN SODIUM AND TAZOBACTAM SODIUM 2.25 G: 2; .25 INJECTION, POWDER, LYOPHILIZED, FOR SOLUTION INTRAVENOUS at 00:25

## 2021-01-02 RX ADMIN — DILTIAZEM HYDROCHLORIDE 60 MG: 60 CAPSULE, EXTENDED RELEASE ORAL at 21:43

## 2021-01-02 NOTE — PROGRESS NOTES
Problem: Mobility Impaired (Adult and Pediatric)  Goal: *Acute Goals and Plan of Care (Insert Text)  Description: Pt will be I with LE HEP in 7 days. Pt will perform bed mobility with CGA in 7 days. Pt will perform transfers with CGA in 7 days. Pt will amb 150 feet with LRAD safely with CGA in 7 days. Outcome: Not Met     Problem: Patient Education: Go to Patient Education Activity  Goal: Patient/Family Education  Outcome: Not Met     PHYSICAL THERAPY EVALUATION  Patient: Zahra Shin (36 y.o. male)  Date: 1/2/2021  Primary Diagnosis: AMS (altered mental status) [R41.82]  Fever [R50.9]  Encephalopathy acute [G93.40]        Precautions: falls, confusion, dementia       ASSESSMENT  68 yom who came into the hospital on 12/29/20 due to AMS. When he frist cam to the hosptial, her had periods of agitation and required a sitter. PMHx: CAD, dementia, Alzheimer's, Psychiatric disorder. Do to his dementia and AMS, it was difficulty to get PLOF from pt. Pt lives in nursing home. ([facility]     Pt A&O x1, only to his name after max VCs. 2 point restraint. He kept eyes closed for 95% of PT eval. BLE 3+/5 in semi-supine position. PT unable to get pt to roll in bed or sit up EOB secondary to 50% command following. He was not able to have a purposeful conversation with PT today. Per nursing, pt tries to get up to walk which is why he is in 2 point restraints. This PT is uncertain of pt's baseline and therefore will try trail acute care PT. Uncertain of his progress. PT would benefit from co-treatments with OT for safety reasons. PT rec DC back to SNF at this time. Patient will benefit from skilled therapy intervention to address the above noted impairments.        PLAN :  Recommendations and Planned Interventions: bed mobility training, transfer training, gait training, therapeutic exercises, neuromuscular re-education, and therapeutic activities      Frequency/Duration: Patient will be followed by physical therapy:  2 times a week to address goals. Recommendation for discharge: (in order for the patient to meet his/her long term goals)  SNF    This discharge recommendation:  Has not yet been discussed the attending provider and/or case management    IF patient discharges home will need the following DME: to be determined (TBD)         SUBJECTIVE:   Patient was not able to have a purposeful conversation with PT today. OBJECTIVE DATA SUMMARY:   HISTORY:    Past Medical History:   Diagnosis Date    Alzheimer disease ([facility]     CAD (coronary artery disease)     Dementia ([facility]     Psychiatric disorder    History reviewed. No pertinent surgical history. Personal factors and/or comorbidities impacting plan of care: see pt chart    Home Situation  Home Environment: [address] [place] [address] Name: [place] [address].  One/Two Story Residence: One story  Living Alone: Yes  Support Systems: Skilled nursing facility  Patient Expects to be Discharged to[de-identified] Skilled nursing facility  Current DME Used/Available at Home: Hospital bed(uncertain of other equipment)    PLOF: uncertain    EXAMINATION/PRESENTATION/DECISION MAKING:   Critical Behavior:  Neurologic State: Confused, Eyes open spontaneously, Alert, Irritable, Restless  Orientation Level: Oriented to person  Cognition: Decreased attention/concentration, Decreased command following, Memory loss     Hearing: Auditory  Auditory Impairment: None    Range Of Motion:  AROM: Generally decreased, functional                       Strength:    Strength: Generally decreased, functional               Functional Mobility:  Bed Mobility:  Rolling: Total assistance;Assist x1            Functional Measure:    [address] \[facility]\"" [address] [address] AM-PAC 6 Clicks         Basic Mobility Inpatient Short Form  How much difficulty does the patient currently have. .. Unable A Lot A Little None   1. Turning over in bed (including adjusting bedclothes, sheets and blankets)?    [x] 1   [] 2 [] 3   [] 4   2. Sitting down on and standing up from a chair with arms ( e.g., wheelchair, bedside commode, etc.)   [x] 1   [] 2   [] 3   [] 4   3. Moving from lying on back to sitting on the side of the bed? [x] 1   [] 2   [] 3   [] 4          How much help from another person does the patient currently need. .. Total A Lot A Little None   4. Moving to and from a bed to a chair (including a wheelchair)? [x] 1   [] 2   [] 3   [] 4   5. Need to walk in hospital room? [x] 1   [] 2   [] 3   [] 4   6. Climbing 3-5 steps with a railing? [x] 1   [] 2   [] 3   [] 4   © 2007, Trustees of [address] [place] [address] [address] [id number], under license to [employer]. All rights reserved     Score:  Initial: MS Most Recent: X (Date: -- )   Interpretation of Tool:  Represents activities that are increasingly more difficult (i.e. Bed mobility, Transfers, Gait). Score 24 23 22-20 19-15 14-10 9-7 6   Modifier CH CI CJ CK CL CM CN          Physical Therapy Evaluation Charge Determination   History Examination Presentation Decision-Making   MEDIUM  Complexity : 1-2 comorbidities / personal factors will impact the outcome/ POC  LOW Complexity : 1-2 Standardized tests and measures addressing body structure, function, activity limitation and / or participation in recreation  LOW Complexity : Stable, uncomplicated  Other Functional Measure AMPA 6 6      Based on the above components, the patient evaluation is determined to be of the following complexity level: LOW     Pain Rating:  No signs of pain    Activity Tolerance:   Poor  Please refer to the flowsheet for vital signs taken during this treatment. After treatment patient left in no apparent distress:   Supine in bed, Call bell within reach, Bed / chair alarm activated, and Restraints    COMMUNICATION/EDUCATION:   The patients plan of care was discussed with: Registered nurse. Patient is unable to participate in goal setting and plan of care.     Thank you for this referral.  Mariaa Cortes Scian   Time Calculation: 15 mins

## 2021-01-02 NOTE — ROUTINE PROCESS
Bedside shift change report given to Kate Reza RN (oncoming nurse) by Oralia Aguila RN (offgoing nurse). Report included the following information SBAR.

## 2021-01-02 NOTE — PROGRESS NOTES
Progress Note  Date:2021       Room:[facility]  Patient Name:Trey Fair     YOB: 1940     Age:80 y.o. Subjective    Subjective Patient  In bed trying to get up one leg on floor. Patien trepostioned in bed, Patient denies any pain or shortness of breath. Review of Systems   Reason unable to perform ROS: limitied due to confusion deneis pain or shortness of breath. Objective         Vitals Last 24 Hours:  TEMPERATURE:  Temp  Av.1 °F (36.7 °C)  Min: 97.6 °F (36.4 °C)  Max: 98.9 °F (37.2 °C)  RESPIRATIONS RANGE: Resp  Av.8  Min: 17  Max: 18  PULSE OXIMETRY RANGE: SpO2  Av.5 %  Min: 95 %  Max: 96 %  PULSE RANGE: Pulse  Av.1  Min: 62  Max: 99  BLOOD PRESSURE RANGE: Systolic (60GXH), GOA:589 , Min:125 , IAZ:256   ; Diastolic (38IKP), GRY:83, Min:72, Max:116    I/O (24Hr): Intake/Output Summary (Last 24 hours) at 2021 1537  Last data filed at 2021 0624  Gross per 24 hour   Intake 300 ml   Output    Net 300 ml     Objective:  General Appearance: In no acute distress and not in pain. Vital signs: (most recent): Blood pressure 125/72, pulse 99, temperature 97.8 °F (36.6 °C), resp. rate 17, height 5' 10\" (1.778 m), weight 124.3 kg (274 lb), SpO2 96 %. Vital signs are normal.    Output: Producing urine and producing stool. HEENT: Normal HEENT exam.    Lungs:  Normal effort and normal respiratory rate. Breath sounds clear to auscultation. Heart: Normal rate. Regular rhythm. S1 normal and S2 normal.    Abdomen: Abdomen is soft. Bowel sounds are normal.   There is no abdominal tenderness. Extremities: Normal range of motion. Neurological: Patient is alert. (To self). Skin:  Warm and dry.       Labs/Imaging/Diagnostics    Labs:  CBC:  Recent Labs     21  0710   WBC 4.9   RBC 4.31   HGB 13.1   HCT 38.0   MCV 88.2   RDW 12.8        CHEMISTRIES:  Recent Labs     21  0710      K 3.4*    CO2 26   BUN 12   CA 8.6   PT/INR:No results for input(s): INR, INREXT in the last 72 hours. No lab exists for component: PROTIME  APTT:No results for input(s): APTT in the last 72 hours. LIVER PROFILE:No results for input(s): AST, ALT in the last 72 hours. No lab exists for component: [facility], ALKPHOS  Lab Results   Component Value Date/Time    ALT (SGPT) 30 12/28/2020 11:00 PM    AST (SGOT) 42 (H) 12/28/2020 11:00 PM    Alk. phosphatase 50 12/28/2020 11:00 PM    Bilirubin, total 0.7 12/28/2020 11:00 PM       Imaging Last 24 Hours:  No results found. Assessment//Plan   Active Problems:    Fever (12/29/2020)      AMS (altered mental status) (12/29/2020)      Encephalopathy acute (12/29/2020)      Assessment:  (Fever resolved    AMS ? Baseline no further neuro workup     aspiration pneumonia patient on IV antibiotics  Dementia severe and end-stage  Hypokalemia replace potassium). Plan:   (Repeat labs in am  Waiting for placement  Continue current regimen  Case discussed with Dr. Ian Gardner).        Electronically signed by Aryan Burrows NP on 1/2/2021 at 3:37 PM

## 2021-01-03 LAB
ANION GAP SERPL CALC-SCNC: 5 MMOL/L (ref 5–15)
BASOPHILS # BLD: 0 K/UL (ref 0–0.1)
BASOPHILS NFR BLD: 0 % (ref 0–1)
BUN SERPL-MCNC: 13 MG/DL (ref 6–20)
BUN/CREAT SERPL: 10 (ref 12–20)
CA-I BLD-MCNC: 8.8 MG/DL (ref 8.5–10.1)
CHLORIDE SERPL-SCNC: 110 MMOL/L (ref 97–108)
CO2 SERPL-SCNC: 24 MMOL/L (ref 21–32)
CREAT SERPL-MCNC: 1.25 MG/DL (ref 0.7–1.3)
DIFFERENTIAL METHOD BLD: NORMAL
EOSINOPHIL # BLD: 0.3 K/UL (ref 0–0.4)
EOSINOPHIL NFR BLD: 4 % (ref 0–7)
ERYTHROCYTE [DISTWIDTH] IN BLOOD BY AUTOMATED COUNT: 13.2 % (ref 11.5–14.5)
GLUCOSE SERPL-MCNC: 89 MG/DL (ref 65–100)
HCT VFR BLD AUTO: 40 % (ref 36.6–50.3)
HGB BLD-MCNC: 13.8 G/DL (ref 12.1–17)
IMM GRANULOCYTES # BLD AUTO: 0 K/UL (ref 0–0.04)
IMM GRANULOCYTES NFR BLD AUTO: 0 % (ref 0–0.5)
LYMPHOCYTES # BLD: 1.1 K/UL (ref 0.8–3.5)
LYMPHOCYTES NFR BLD: 14 % (ref 12–49)
MCH RBC QN AUTO: 30.5 PG (ref 26–34)
MCHC RBC AUTO-ENTMCNC: 34.5 G/DL (ref 30–36.5)
MCV RBC AUTO: 88.3 FL (ref 80–99)
MONOCYTES # BLD: 0.7 K/UL (ref 0–1)
MONOCYTES NFR BLD: 9 % (ref 5–13)
NEUTS SEG # BLD: 5.6 K/UL (ref 1.8–8)
NEUTS SEG NFR BLD: 73 % (ref 32–75)
PLATELET # BLD AUTO: 259 K/UL (ref 150–400)
PMV BLD AUTO: 9.3 FL (ref 8.9–12.9)
POTASSIUM SERPL-SCNC: 4.2 MMOL/L (ref 3.5–5.1)
RBC # BLD AUTO: 4.53 M/UL (ref 4.1–5.7)
SODIUM SERPL-SCNC: 139 MMOL/L (ref 136–145)
WBC # BLD AUTO: 7.7 K/UL (ref 4.1–11.1)

## 2021-01-03 PROCEDURE — 36415 COLL VENOUS BLD VENIPUNCTURE: CPT

## 2021-01-03 PROCEDURE — 74011250637 HC RX REV CODE- 250/637: Performed by: NURSE PRACTITIONER

## 2021-01-03 PROCEDURE — 85025 COMPLETE CBC W/AUTO DIFF WBC: CPT

## 2021-01-03 PROCEDURE — 65270000029 HC RM PRIVATE

## 2021-01-03 PROCEDURE — 74011000258 HC RX REV CODE- 258: Performed by: INTERNAL MEDICINE

## 2021-01-03 PROCEDURE — 80048 BASIC METABOLIC PNL TOTAL CA: CPT

## 2021-01-03 PROCEDURE — 97165 OT EVAL LOW COMPLEX 30 MIN: CPT

## 2021-01-03 PROCEDURE — 97535 SELF CARE MNGMENT TRAINING: CPT

## 2021-01-03 PROCEDURE — 74011250636 HC RX REV CODE- 250/636: Performed by: INTERNAL MEDICINE

## 2021-01-03 PROCEDURE — 74011250637 HC RX REV CODE- 250/637: Performed by: INTERNAL MEDICINE

## 2021-01-03 RX ORDER — HYDRALAZINE HYDROCHLORIDE 25 MG/1
25 TABLET, FILM COATED ORAL
Status: DISCONTINUED | OUTPATIENT
Start: 2021-01-03 | End: 2021-01-26 | Stop reason: HOSPADM

## 2021-01-03 RX ADMIN — PIPERACILLIN SODIUM AND TAZOBACTAM SODIUM 2.25 G: 2; .25 INJECTION, POWDER, LYOPHILIZED, FOR SOLUTION INTRAVENOUS at 01:38

## 2021-01-03 RX ADMIN — HALOPERIDOL LACTATE 1 MG: 5 INJECTION, SOLUTION INTRAMUSCULAR at 14:24

## 2021-01-03 RX ADMIN — HYDRALAZINE HYDROCHLORIDE 25 MG: 25 TABLET, FILM COATED ORAL at 23:35

## 2021-01-03 RX ADMIN — DILTIAZEM HYDROCHLORIDE 60 MG: 60 CAPSULE, EXTENDED RELEASE ORAL at 08:35

## 2021-01-03 RX ADMIN — PIPERACILLIN SODIUM AND TAZOBACTAM SODIUM 2.25 G: 2; .25 INJECTION, POWDER, LYOPHILIZED, FOR SOLUTION INTRAVENOUS at 08:35

## 2021-01-03 RX ADMIN — DILTIAZEM HYDROCHLORIDE 60 MG: 60 CAPSULE, EXTENDED RELEASE ORAL at 19:51

## 2021-01-03 RX ADMIN — HALOPERIDOL LACTATE 1 MG: 5 INJECTION, SOLUTION INTRAMUSCULAR at 23:35

## 2021-01-03 RX ADMIN — PIPERACILLIN SODIUM AND TAZOBACTAM SODIUM 2.25 G: 2; .25 INJECTION, POWDER, LYOPHILIZED, FOR SOLUTION INTRAVENOUS at 17:35

## 2021-01-03 RX ADMIN — DEXTROSE AND SODIUM CHLORIDE 75 ML/HR: 5; 450 INJECTION, SOLUTION INTRAVENOUS at 01:38

## 2021-01-03 RX ADMIN — ENOXAPARIN SODIUM 40 MG: 40 INJECTION SUBCUTANEOUS at 08:35

## 2021-01-03 NOTE — PROGRESS NOTES
Problem: Self Care Deficits Care Plan (Adult)  Goal: *Acute Goals and Plan of Care (Insert Text)  Description: GOAL 1:  PT WILL [address] FACE, ARMS AND LEGS GIVEN SET UP, CUES AND SET UP. GOAL 2:  PT WILL SELF FEED GIVEN SET UP AND SUPERVISION EOB. GOAL 3:  PT WILL TRANSFER SIT TO STAND GIVEN MIN ASSIST AT RW LEVEL FOR LB BATHING AND DRESSING PREP. Outcome: Not Met    OCCUPATIONAL THERAPY EVALUATION  Patient: Ariadna Ward (85 y.o. male)  Date: 1/3/2021  Primary Diagnosis: AMS (altered mental status) [R41.82]  Fever [R50.9]  Encephalopathy acute [G93.40]       Precautions: falls      ASSESSMENT    Room 1340 [facility], [address] [facility]  12/29/20  CHART REVIEW:  Ariadna Ward is a 68 y.o. male who has a history of CAD, dementia, Alzheimer's and of nursing home was sent to the hospital for altered mental status. Patient had a fever and appeared more confused than baseline mentation. Patient is unable to give history. Patient had periods of agitation in the ED presently has a sitter by the bedside  ON 12/31/20: CM received telephone call from spouse Nacho Floyd [phone]. She stated that she cannot take care of the patient at home currently and would like him to go back to [place] [address]. SNF at discharge as first choice. She also stated if he cannot get back into [facility] then any SNF in the area would be okay. She states she has already paid [facility] 75. for the month of Jan 2021. CM sent referral, pending acceptance at this time. Pt greeted OT supine in bed with restraints on both wrists c/o being hot. Restraints removed for ADLs. OT set up pt with cold wash cloth and pt able to wash face, arm, and prox legs stating it felt so good. Pt instructed to transfer EOB for breakfast.  Pt stated \"he could eat a cow\". Pt transfers supine to EOB at SBA; pt scooted close to edge of bed to reach meal.  Pt given set up - cutting up food and opening containers only.   Pt able to self feed at SBA; picked up wet washcloth and wiped off mouth on and off throughout meal without cues. Pt self feed 100% of meal at supervision level   Pt demonstrates good sitting balance sitting EOB during meal > 30 minutes  Cognition:  Alert and Oriented to name and wife's name. Pt did not state birthday intially but after wiping self down when asked again, pt stated birthday correctly. 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 Pt transferred EOB to supine end of session, restraints put back on along with bed alarm. Call bell placed next to pt. Updated PCT on session    Pt will benefit from skilled OT to address decreased functional mobility and decreased ADL performance until maximum potential has been met. Recommend 24 hour care at home or in [address] [facility]:  18/24 - indicative of pt needing 24 hour supv due to cognitive deficits. Mica Hughes PLAN :  Recommendations and Planned Interventions: functional mobility training, therapeutic exercise, balance training, therapeutic activities, cognitive retraining and endurance activities    Frequency/Duration: Patient will be followed by occupational therapy 3 times a week to address goals. Recommendation for discharge: (in order for the patient to meet his/her long term goals)  24 HOUR CARE    This discharge recommendation:  Has been made in collaboration with the attending provider and/or case management    IF patient discharges home will need the following DME: NONE       SUBJECTIVE:   Patient stated I COULD EAT A COW.     OBJECTIVE DATA SUMMARY:   HISTORY:   Past Medical History:   Diagnosis Date    Alzheimer disease ([facility] [address]     CAD (coronary artery disease)     Dementia ([facility] [address]     Psychiatric disorder    History reviewed. No pertinent surgical history.     Expanded or extensive additional review of patient history:     Home Situation  Home Environment: [address] [facility] Name: ([address] [facility])  One/Two Story Residence: One story  Living Alone: Yes  Support Systems: Skilled nursing facility  Patient Expects to be Discharged to[de-identified] Skilled nursing facility  Current DME Used/Available at Home: Hospital bed(uncertain of other equipment)    PLOF: Pt REQUIRES ASSISTANCE for ADLS/IADLS, AND WITH with mobility prior to admission due to cognitive deficits. Hand dominance: Left    EXAMINATION OF PERFORMANCE DEFICITS:  Cognitive/Behavioral Status:  Neurologic State: Alert;Confused  Orientation Level: Oriented to person  Cognition: Follows commands  Perception: Appears intact  Perseveration: No perseveration noted  Safety/Judgement: Decreased awareness of environment; Fall prevention    Hearing: Auditory  Auditory Impairment: None    Coordination:     Fine Motor Skills-Upper: Left Intact; Right Intact    Gross Motor Skills-Upper: Left Intact; Right Intact      Balance:  Sitting: Intact; Without support  Standing: Intact; With support  Fair    Functional Mobility and Transfers for ADLs:  Bed Mobility:  Rolling: Supervision  Supine to Sit: Supervision  Sit to Supine: Supervision  Scooting: Supervision    Transfers:      Sit to stand:  EOB to Stand RW level - verbal/tactile cues and mod assistance       ADL Intervention and task modifications:  Feeding  Feeding Assistance: Set-up; Supervision  Container Management: Set-up; Maximum assistance  Cutting Food: Set-up; Maximum assistance  Utensil Management: Independent  Food to Mouth: Independent  Drink to Mouth: Independent    Grooming  Grooming Assistance: Set-up; Supervision  Position Performed: Long sitting on bed  Washing Face: Set-up; Supervision  Washing Hands: Set-up; Supervision    Upper Body Bathing  Bathing Assistance: Set-up; Supervision  Position Performed: Long sitting on bed  Cues: Tactile cues provided;Verbal cues provided    Lower Body Bathing  Bathing Assistance: Maximum assistance  Position Performed: Supine      Cognitive Retraining  Safety/Judgement: Decreased awareness of environment;  W Schwarz  AM-PACTM \"6 Clicks\"                                                       Daily Activity Inpatient Short Form  How much help from another person does the patient currently need. .. Total; A Lot A Little None   1. Putting on and taking off regular lower body clothing? []  1 []  2 [x]  3 []  4   2. Bathing (including washing, rinsing, drying)? []  1 []  2 [x]  3 []  4   3. Toileting, which includes using toilet, bedpan or urinal? [] 1 [x]  2 []  3 []  4   4. Putting on and taking off regular upper body clothing? []  1 []  2 [x]  3 []  4   5. Taking care of personal grooming such as brushing teeth? []  1 []  2 [x]  3 []  4   6. Eating meals? []  1 []  2 []  3 [x]  4   © , Trustees of [facility] [employer], under license to [employer]. All rights reserved     Score:24     Interpretation of Tool:  Represents clinically-significant functional categories (i.e. Activities of daily living). Percentage of Impairment CH    0%   CI    1-19% CJ    20-39% CK    40-59% CL    60-79% CM    80-99% CN     100%   AMPA  Score 6-24 24 23 20-22 15-19 10-14 7-9 6        Occupational Therapy Evaluation Charge Determination   History Examination Decision-Making   LOW Complexity : Brief history review  LOW Complexity : 1-3 performance deficits relating to physical, cognitive , or psychosocial skils that result in activity limitations and / or participation restrictions  LOW Complexity : No comorbidities that affect functional and no verbal or physical assistance needed to complete eval tasks       Based on the above components, the patient evaluation is determined to be of the following complexity level: LOW   Pain Ratin/10 - no c/o pain throughout session    Activity Tolerance:   Good  Please refer to the flowsheet for vital signs taken during this treatment.     After treatment patient left in no apparent distress:    Supine in bed    COMMUNICATION/EDUCATION:   The patients plan of care was discussed with: Registered nurse. Patient is unable to participate in goal setting and plan of care. This patients plan of care is appropriate for delegation to [facility].     Thank you for this referral.  Fernanda Tidwell, OT

## 2021-01-03 NOTE — PROGRESS NOTES
Progress Note  Date:1/3/2021       Room:[facility]  Patient Name:Trey Wilson     YOB: 1940     Age:80 y.o. Subjective    Subjective:  Pain:  He reports no pain. Patient sitting up in bed pleasantly confused denies any pain. Review of Systems   Unable to perform ROS: Dementia     Objective         Vitals Last 24 Hours:  TEMPERATURE:  Temp  Av.2 °F (36.8 °C)  Min: 97.7 °F (36.5 °C)  Max: 98.5 °F (36.9 °C)  RESPIRATIONS RANGE: Resp  Av  Min: 18  Max: 20  PULSE OXIMETRY RANGE: SpO2  Av.8 %  Min: 95 %  Max: 97 %  PULSE RANGE: Pulse  Av.3  Min: 83  Max: 98  BLOOD PRESSURE RANGE: Systolic (42YBU), AFQ:183 , Min:126 , KCW:751   ; Diastolic (14GZD), RIP:13, Min:87, Max:97    I/O (24Hr): No intake or output data in the 24 hours ending 21 170  Objective:  General Appearance: In no acute distress and not in pain. Vital signs: (most recent): Blood pressure 126/87, pulse 97, temperature 98.1 °F (36.7 °C), resp. rate 18, height 5' 10\" (1.778 m), weight 124.3 kg (274 lb), SpO2 95 %. Vital signs are normal.    Output: Producing urine and producing stool. HEENT: Normal HEENT exam.    Lungs:  Normal effort and normal respiratory rate. Breath sounds clear to auscultation. Heart: Normal rate. Regular rhythm. S1 normal and S2 normal.    Abdomen: Abdomen is soft. Bowel sounds are normal.   There is no abdominal tenderness. Extremities: Normal range of motion. Neurological: Patient is alert. (To self). Skin:  Warm and dry.       Labs/Imaging/Diagnostics    Labs:  CBC:  Recent Labs     21  1100 21  0710   WBC 7.7 4.9   RBC 4.53 4.31   HGB 13.8 13.1   HCT 40.0 38.0   MCV 88.3 88.2   RDW 13.2 12.8    246     CHEMISTRIES:  Recent Labs     21  1100 21  0710    140   K 4.2 3.4*   * 108   CO2 24 26   BUN 13 12   CA 8.8 8.6   PT/INR:No results for input(s): INR, INREXT in the last 72 hours.    No lab exists for component: PROTIME  APTT:No results for input(s): APTT in the last 72 hours. LIVER PROFILE:No results for input(s): AST, ALT in the last 72 hours. No lab exists for component: Francois Elaine, ALKPHOS  Lab Results   Component Value Date/Time    ALT (SGPT) 30 12/28/2020 11:00 PM    AST (SGOT) 42 (H) 12/28/2020 11:00 PM    Alk. phosphatase 50 12/28/2020 11:00 PM    Bilirubin, total 0.7 12/28/2020 11:00 PM       Imaging Last 24 Hours:  No results found. Assessment//Plan   Active Problems:    Fever (12/29/2020)      AMS (altered mental status) (12/29/2020)      Encephalopathy acute (12/29/2020)      Assessment: ((Fever resolved    AMS ? Baseline no further neuro workup     aspiration pneumonia patient on IV antibiotics  Dementia severe and end-stage  Hypokalemia replace potassium resolved   ). Plan:   (Continue current regimen  Case discussed with Dr. Praveen Harmon).        Electronically signed by Augustine Barron NP on 1/3/2021 at 5:09 PM

## 2021-01-04 LAB
BACTERIA SPEC CULT: NORMAL
BACTERIA SPEC CULT: NORMAL
SPECIAL REQUESTS,SREQ: NORMAL
SPECIAL REQUESTS,SREQ: NORMAL

## 2021-01-04 PROCEDURE — 65270000029 HC RM PRIVATE

## 2021-01-04 PROCEDURE — 74011250636 HC RX REV CODE- 250/636: Performed by: INTERNAL MEDICINE

## 2021-01-04 PROCEDURE — 74011250637 HC RX REV CODE- 250/637: Performed by: PSYCHIATRY & NEUROLOGY

## 2021-01-04 PROCEDURE — 74011250637 HC RX REV CODE- 250/637: Performed by: NURSE PRACTITIONER

## 2021-01-04 PROCEDURE — 74011250637 HC RX REV CODE- 250/637: Performed by: INTERNAL MEDICINE

## 2021-01-04 PROCEDURE — 74011000258 HC RX REV CODE- 258: Performed by: INTERNAL MEDICINE

## 2021-01-04 RX ORDER — AMLODIPINE BESYLATE 5 MG/1
5 TABLET ORAL DAILY
Status: DISCONTINUED | OUTPATIENT
Start: 2021-01-04 | End: 2021-01-26 | Stop reason: HOSPADM

## 2021-01-04 RX ORDER — METOPROLOL SUCCINATE 25 MG/1
25 TABLET, EXTENDED RELEASE ORAL DAILY
Status: DISCONTINUED | OUTPATIENT
Start: 2021-01-04 | End: 2021-01-26 | Stop reason: HOSPADM

## 2021-01-04 RX ORDER — HYDRALAZINE HYDROCHLORIDE 50 MG/1
100 TABLET, FILM COATED ORAL 3 TIMES DAILY
Status: DISCONTINUED | OUTPATIENT
Start: 2021-01-04 | End: 2021-01-04

## 2021-01-04 RX ORDER — OLANZAPINE 5 MG/1
5 TABLET, ORALLY DISINTEGRATING ORAL 2 TIMES DAILY
Status: DISCONTINUED | OUTPATIENT
Start: 2021-01-04 | End: 2021-01-06

## 2021-01-04 RX ORDER — DILTIAZEM HYDROCHLORIDE 60 MG/1
60 CAPSULE, EXTENDED RELEASE ORAL EVERY 6 HOURS
Status: DISCONTINUED | OUTPATIENT
Start: 2021-01-04 | End: 2021-01-04

## 2021-01-04 RX ADMIN — OLANZAPINE 5 MG: 5 TABLET, ORALLY DISINTEGRATING ORAL at 09:54

## 2021-01-04 RX ADMIN — HYDRALAZINE HYDROCHLORIDE 25 MG: 25 TABLET, FILM COATED ORAL at 17:07

## 2021-01-04 RX ADMIN — DILTIAZEM HYDROCHLORIDE 60 MG: 60 CAPSULE, EXTENDED RELEASE ORAL at 09:55

## 2021-01-04 RX ADMIN — HALOPERIDOL LACTATE 1 MG: 5 INJECTION, SOLUTION INTRAMUSCULAR at 09:54

## 2021-01-04 RX ADMIN — PIPERACILLIN SODIUM AND TAZOBACTAM SODIUM 2.25 G: 2; .25 INJECTION, POWDER, LYOPHILIZED, FOR SOLUTION INTRAVENOUS at 09:54

## 2021-01-04 RX ADMIN — HYDRALAZINE HYDROCHLORIDE 100 MG: 50 TABLET, FILM COATED ORAL at 17:06

## 2021-01-04 RX ADMIN — HYDRALAZINE HYDROCHLORIDE 100 MG: 50 TABLET, FILM COATED ORAL at 09:55

## 2021-01-04 RX ADMIN — OLANZAPINE 5 MG: 5 TABLET, ORALLY DISINTEGRATING ORAL at 21:17

## 2021-01-04 RX ADMIN — METOPROLOL SUCCINATE 25 MG: 25 TABLET, EXTENDED RELEASE ORAL at 18:22

## 2021-01-04 RX ADMIN — AMLODIPINE BESYLATE 5 MG: 5 TABLET ORAL at 18:22

## 2021-01-04 RX ADMIN — HALOPERIDOL LACTATE 1 MG: 5 INJECTION, SOLUTION INTRAMUSCULAR at 23:28

## 2021-01-04 RX ADMIN — PIPERACILLIN SODIUM AND TAZOBACTAM SODIUM 2.25 G: 2; .25 INJECTION, POWDER, LYOPHILIZED, FOR SOLUTION INTRAVENOUS at 17:06

## 2021-01-04 RX ADMIN — ENOXAPARIN SODIUM 40 MG: 40 INJECTION SUBCUTANEOUS at 09:55

## 2021-01-04 RX ADMIN — PIPERACILLIN SODIUM AND TAZOBACTAM SODIUM 2.25 G: 2; .25 INJECTION, POWDER, LYOPHILIZED, FOR SOLUTION INTRAVENOUS at 01:00

## 2021-01-04 NOTE — PROGRESS NOTES
Progress Note    Patient: Jackie Peck MRN: [id number]  SSN: [id number]    YOB: 1940  Age: 68 y.o. Sex: male      Admit Date: 12/28/2020    LOS: 6 days     Subjective:     Patient is drowsy    Objective:     Vitals:    01/03/21 1917 01/03/21 2330 01/04/21 0421 01/04/21 0740   BP: (!) 161/89 (!) 186/88 (!) 161/98 (!) 182/102   Pulse: 86 95 89 97   Resp: 18 18     Temp: 99 °F (37.2 °C) 98.3 °F (36.8 °C)  98.4 °F (36.9 °C)   SpO2: 95% 96%  95%   Weight:       Height:            Intake and Output:  Current Shift: No intake/output data recorded. Last three shifts: No intake/output data recorded. Physical Exam:   General:   drowsy   Eyes:  Conjunctivae/corneas clear. PERRL, EOMs intact. Fundi benign   Ears:  Normal TMs and external ear canals both ears. Nose: Nares normal. Septum midline. Mucosa normal. No drainage or sinus tenderness. Mouth/Throat: Lips, mucosa, and tongue normal. Teeth and gums normal.   Neck: Supple, symmetrical, trachea midline, no adenopathy, thyroid: no enlargment/tenderness/nodules, no carotid bruit and no JVD. Back:   Symmetric, no curvature. ROM normal. No CVA tenderness. Lungs:   Clear to auscultation bilaterally. Heart:  Regular rate and rhythm, S1, S2 normal, no murmur, click, rub or gallop. Abdomen:   Soft, non-tender. Bowel sounds normal. No masses,  No organomegaly. Extremities: Extremities normal, atraumatic, no cyanosis or edema. Pulses: 2+ and symmetric all extremities. Skin: Skin color, texture, turgor normal. No rashes or lesions   Lymph nodes: Cervical, supraclavicular, and axillary nodes normal.   Neurologic: Drowsy       Lab/Data Review: All lab results for the last 24 hours reviewed. No results found for this or any previous visit (from the past 24 hour(s)).       Assessment:     Active Problems:    Fever (12/29/2020)      AMS (altered mental status) (12/29/2020)      Encephalopathy acute (12/29/2020)    Aspiration pneumonia  Dementia with occasional agitation    Plan:     Plan discharge in a.m. once  gets nursing home for acceptance    Signed By: Yoshi Kemp MD     January 4, 2021

## 2021-01-04 NOTE — PROGRESS NOTES
Attempted to pass NG tube per telephone order from Dr. Zeke King. Unable to successfully insert due to patient thrashing around in bed, shaking head and kicking legs. Will attempt to administer oral meds if patient is able to follow commands.

## 2021-01-05 LAB
ANION GAP SERPL CALC-SCNC: 7 MMOL/L (ref 5–15)
BASOPHILS # BLD: 0 K/UL (ref 0–0.1)
BASOPHILS NFR BLD: 0 % (ref 0–1)
BUN SERPL-MCNC: 24 MG/DL (ref 6–20)
BUN/CREAT SERPL: 15 (ref 12–20)
CA-I BLD-MCNC: 9.2 MG/DL (ref 8.5–10.1)
CHLORIDE SERPL-SCNC: 110 MMOL/L (ref 97–108)
CO2 SERPL-SCNC: 27 MMOL/L (ref 21–32)
CREAT SERPL-MCNC: 1.64 MG/DL (ref 0.7–1.3)
DIFFERENTIAL METHOD BLD: NORMAL
EOSINOPHIL # BLD: 0.2 K/UL (ref 0–0.4)
EOSINOPHIL NFR BLD: 2 % (ref 0–7)
ERYTHROCYTE [DISTWIDTH] IN BLOOD BY AUTOMATED COUNT: 13.5 % (ref 11.5–14.5)
GLUCOSE SERPL-MCNC: 107 MG/DL (ref 65–100)
HCT VFR BLD AUTO: 42.7 % (ref 36.6–50.3)
HGB BLD-MCNC: 14.6 G/DL (ref 12.1–17)
IMM GRANULOCYTES # BLD AUTO: 0 K/UL (ref 0–0.04)
IMM GRANULOCYTES NFR BLD AUTO: 0 % (ref 0–0.5)
LYMPHOCYTES # BLD: 1.3 K/UL (ref 0.8–3.5)
LYMPHOCYTES NFR BLD: 17 % (ref 12–49)
MCH RBC QN AUTO: 30.4 PG (ref 26–34)
MCHC RBC AUTO-ENTMCNC: 34.2 G/DL (ref 30–36.5)
MCV RBC AUTO: 88.8 FL (ref 80–99)
MONOCYTES # BLD: 0.8 K/UL (ref 0–1)
MONOCYTES NFR BLD: 10 % (ref 5–13)
NEUTS SEG # BLD: 5.4 K/UL (ref 1.8–8)
NEUTS SEG NFR BLD: 71 % (ref 32–75)
NRBC # BLD: 0 K/UL (ref 0–0.01)
NRBC BLD-RTO: 0 PER 100 WBC
PLATELET # BLD AUTO: 298 K/UL (ref 150–400)
PMV BLD AUTO: 9.3 FL (ref 8.9–12.9)
POTASSIUM SERPL-SCNC: 3.5 MMOL/L (ref 3.5–5.1)
RBC # BLD AUTO: 4.81 M/UL (ref 4.1–5.7)
SODIUM SERPL-SCNC: 144 MMOL/L (ref 136–145)
WBC # BLD AUTO: 7.7 K/UL (ref 4.1–11.1)

## 2021-01-05 PROCEDURE — 85025 COMPLETE CBC W/AUTO DIFF WBC: CPT

## 2021-01-05 PROCEDURE — 80048 BASIC METABOLIC PNL TOTAL CA: CPT

## 2021-01-05 PROCEDURE — 65270000029 HC RM PRIVATE

## 2021-01-05 PROCEDURE — 74011250637 HC RX REV CODE- 250/637: Performed by: NURSE PRACTITIONER

## 2021-01-05 PROCEDURE — 74011250637 HC RX REV CODE- 250/637: Performed by: INTERNAL MEDICINE

## 2021-01-05 PROCEDURE — 74011000258 HC RX REV CODE- 258: Performed by: INTERNAL MEDICINE

## 2021-01-05 PROCEDURE — 36415 COLL VENOUS BLD VENIPUNCTURE: CPT

## 2021-01-05 PROCEDURE — 74011250637 HC RX REV CODE- 250/637: Performed by: PSYCHIATRY & NEUROLOGY

## 2021-01-05 PROCEDURE — 74011250636 HC RX REV CODE- 250/636: Performed by: INTERNAL MEDICINE

## 2021-01-05 RX ORDER — AMOXICILLIN AND CLAVULANATE POTASSIUM 500; 125 MG/1; MG/1
1 TABLET, FILM COATED ORAL EVERY 12 HOURS
Status: DISCONTINUED | OUTPATIENT
Start: 2021-01-05 | End: 2021-01-16

## 2021-01-05 RX ADMIN — AMOXICILLIN AND CLAVULANATE POTASSIUM 1 TABLET: 500; 125 TABLET, FILM COATED ORAL at 20:05

## 2021-01-05 RX ADMIN — AMLODIPINE BESYLATE 5 MG: 5 TABLET ORAL at 09:40

## 2021-01-05 RX ADMIN — OLANZAPINE 5 MG: 5 TABLET, ORALLY DISINTEGRATING ORAL at 20:05

## 2021-01-05 RX ADMIN — PIPERACILLIN SODIUM AND TAZOBACTAM SODIUM 2.25 G: 2; .25 INJECTION, POWDER, LYOPHILIZED, FOR SOLUTION INTRAVENOUS at 09:40

## 2021-01-05 RX ADMIN — METOPROLOL SUCCINATE 25 MG: 25 TABLET, EXTENDED RELEASE ORAL at 09:40

## 2021-01-05 RX ADMIN — PIPERACILLIN SODIUM AND TAZOBACTAM SODIUM 2.25 G: 2; .25 INJECTION, POWDER, LYOPHILIZED, FOR SOLUTION INTRAVENOUS at 00:54

## 2021-01-05 RX ADMIN — HYDRALAZINE HYDROCHLORIDE 25 MG: 25 TABLET, FILM COATED ORAL at 09:40

## 2021-01-05 RX ADMIN — ENOXAPARIN SODIUM 40 MG: 40 INJECTION SUBCUTANEOUS at 09:40

## 2021-01-05 RX ADMIN — OLANZAPINE 5 MG: 5 TABLET, ORALLY DISINTEGRATING ORAL at 09:40

## 2021-01-05 NOTE — PROGRESS NOTES
MADDIE received telephone call from patients spouse Glenn Gaitan [phone] on afternoon of 1/4/2021. She would like patients referral sent to [facility], and [facility]. CM sent referrals, but also explained to Mrs. Lucero Momin that patient was LTC at [facility] and would have to discharge back there if the other facilities have not accepted by the time patient is ready for discharge. Mrs. Lucero Momin verbalized understanding. MADDIE reviewed chart on morning of 1/5/2021, attending is potentially planning discharge today. MADDIE called admissions liaison at [facility] to inform her that patient is ready to return today,  She stated she was on a conference call and would call CM back once call was completed and she knew their bed situation.

## 2021-01-05 NOTE — PROGRESS NOTES
CM informed by Jim Cintron, Patient was issued a 30 day discharge notice at [facility] on 12/17/2020.  30 days has not elapsed. CM attempted to have patient transitioned to [facility] before 30 days had elapsed. CM informed patient cannot return to [facility] by admissions liaison. MADDIE called Dianelys Dennison [phone] and informed her of the situation, she will follow up with the patients wife and facility. CM will follow up with case on 1/6/2020.

## 2021-01-05 NOTE — PROGRESS NOTES
Progress Note    Patient: Trey Martel MRN: [id number]  SSN: [id number]    YOB: 1940  Age: 80 y.o.  Sex: male      Admit Date: 12/28/2020    LOS: 7 days     Subjective:     Patient appears to be confused is in restraints    Objective:     Vitals:    01/04/21 2052 01/05/21 0152 01/05/21 0324 01/05/21 0734   BP: (!) 161/89 (!) 160/98 (!) 159/96 (!) 159/97   Pulse: 78 91 94 92   Resp: 18 20 20 22   Temp: 98.3 °F (36.8 °C) 98.6 °F (37 °C) 98 °F (36.7 °C) 98.1 °F (36.7 °C)   SpO2: 97% 99% 96% 97%   Weight:       Height:            Intake and Output:  Current Shift: No intake/output data recorded.  Last three shifts: No intake/output data recorded.    Physical Exam:   General:     Eyes:  Conjunctivae/corneas clear. PERRL, EOMs intact. Fundi benign   Ears:  Normal TMs and external ear canals both ears.   Nose: Nares normal. Septum midline. Mucosa normal. No drainage or sinus tenderness.   Mouth/Throat: Lips, mucosa, and tongue normal. Teeth and gums normal.   Neck: Supple, symmetrical, trachea midline, no adenopathy, thyroid: no enlargment/tenderness/nodules, no carotid bruit and no JVD.   Back:   Symmetric, no curvature. ROM normal. No CVA tenderness.   Lungs:   Clear to auscultation bilaterally.   Heart:  Regular rate and rhythm, S1, S2 normal, no murmur, click, rub or gallop.   Abdomen:   Soft, non-tender. Bowel sounds normal. No masses,  No organomegaly.   Extremities: Extremities normal, atraumatic, no cyanosis or edema.   Pulses: 2+ and symmetric all extremities.   Skin: Skin color, texture, turgor normal. No rashes or lesions   Lymph nodes: Cervical, supraclavicular, and axillary nodes normal.   Neurologic:        Lab/Data Review:  All lab results for the last 24 hours reviewed.     No results found for this or any previous visit (from the past 24 hour(s)).      Assessment:     Active Problems:    Fever (12/29/2020)      AMS (altered mental status) (12/29/2020)      Encephalopathy acute  (12/29/2020)    Possible aspiration pneumonia transition patient to p.o.  Augmentin  Dementia with agitation Posey bed and discontinue restraints  Plan:      for placement    Signed By: Pat Gomez MD     January 5, 2021

## 2021-01-05 NOTE — PROGRESS NOTES
Pt. Unresponsive to sternal rub, and VC to wake up for therapy session. Will follow up as time allows.

## 2021-01-06 PROCEDURE — 74011250637 HC RX REV CODE- 250/637: Performed by: INTERNAL MEDICINE

## 2021-01-06 PROCEDURE — 74011250636 HC RX REV CODE- 250/636: Performed by: INTERNAL MEDICINE

## 2021-01-06 PROCEDURE — 74011250637 HC RX REV CODE- 250/637: Performed by: PSYCHIATRY & NEUROLOGY

## 2021-01-06 PROCEDURE — 65270000029 HC RM PRIVATE

## 2021-01-06 RX ORDER — OLANZAPINE 5 MG/1
10 TABLET, ORALLY DISINTEGRATING ORAL 2 TIMES DAILY
Status: DISCONTINUED | OUTPATIENT
Start: 2021-01-06 | End: 2021-01-08

## 2021-01-06 RX ADMIN — METOPROLOL SUCCINATE 25 MG: 25 TABLET, EXTENDED RELEASE ORAL at 09:22

## 2021-01-06 RX ADMIN — APIXABAN 2.5 MG: 2.5 TABLET, FILM COATED ORAL at 21:39

## 2021-01-06 RX ADMIN — APIXABAN 2.5 MG: 2.5 TABLET, FILM COATED ORAL at 09:22

## 2021-01-06 RX ADMIN — AMOXICILLIN AND CLAVULANATE POTASSIUM 1 TABLET: 500; 125 TABLET, FILM COATED ORAL at 09:22

## 2021-01-06 RX ADMIN — AMOXICILLIN AND CLAVULANATE POTASSIUM 1 TABLET: 500; 125 TABLET, FILM COATED ORAL at 21:38

## 2021-01-06 RX ADMIN — OLANZAPINE 5 MG: 5 TABLET, ORALLY DISINTEGRATING ORAL at 09:22

## 2021-01-06 RX ADMIN — OLANZAPINE 10 MG: 5 TABLET, ORALLY DISINTEGRATING ORAL at 21:38

## 2021-01-06 RX ADMIN — HALOPERIDOL LACTATE 1 MG: 5 INJECTION, SOLUTION INTRAMUSCULAR at 19:29

## 2021-01-06 RX ADMIN — AMLODIPINE BESYLATE 5 MG: 5 TABLET ORAL at 09:22

## 2021-01-06 NOTE — PROGRESS NOTES
Progress Note    Patient: Zahra Shin MRN: [id number]  SSN: [id number]    YOB: 1940  Age: 68 y.o. Sex: male      Admit Date: 12/28/2020    LOS: 8 days     Subjective:     Patient appears to be confused is in restraints    Objective:     Vitals:    01/05/21 2000 01/05/21 2308 01/06/21 0921 01/06/21 1212   BP: 119/75 131/89 (!) 146/79    Pulse: 89 95 76    Resp: 18 18 18    Temp:  98 °F (36.7 °C) 98.1 °F (36.7 °C)    SpO2: 100% 100% 95%    Weight:       Height:    5' 10\" (1.778 m)        Intake and Output:  Current Shift: No intake/output data recorded. Last three shifts: No intake/output data recorded. Physical Exam:   General:     Eyes:  Conjunctivae/corneas clear. PERRL, EOMs intact. Fundi benign   Ears:  Normal TMs and external ear canals both ears. Nose: Nares normal. Septum midline. Mucosa normal. No drainage or sinus tenderness. Mouth/Throat: Lips, mucosa, and tongue normal. Teeth and gums normal.   Neck: Supple, symmetrical, trachea midline, no adenopathy, thyroid: no enlargment/tenderness/nodules, no carotid bruit and no JVD. Back:   Symmetric, no curvature. ROM normal. No CVA tenderness. Lungs:   Clear to auscultation bilaterally. Heart:  Regular rate and rhythm, S1, S2 normal, no murmur, click, rub or gallop. Abdomen:   Soft, non-tender. Bowel sounds normal. No masses,  No organomegaly. Extremities: Extremities normal, atraumatic, no cyanosis or edema. Pulses: 2+ and symmetric all extremities. Skin: Skin color, texture, turgor normal. No rashes or lesions   Lymph nodes: Cervical, supraclavicular, and axillary nodes normal.   Neurologic:        Lab/Data Review: All lab results for the last 24 hours reviewed.      Recent Results (from the past 24 hour(s))   METABOLIC PANEL, BASIC    Collection Time: 01/05/21  9:06 PM   Result Value Ref Range    Sodium 144 136 - 145 mmol/L    Potassium 3.5 3.5 - 5.1 mmol/L    Chloride 110 (H) 97 - 108 mmol/L    CO2 27 21 - 32 mmol/L    Anion gap 7 5 - 15 mmol/L    Glucose 107 (H) 65 - 100 mg/dL    BUN 24 (H) 6 - 20 mg/dL    Creatinine 1.64 (H) 0.70 - 1.30 mg/dL    BUN/Creatinine ratio 15 12 - 20      GFR est AA 49 (L) >60 ml/min/1.73m2    GFR est non-AA 41 (L) >60 ml/min/1.73m2    Calcium 9.2 8.5 - 10.1 mg/dL   CBC WITH AUTOMATED DIFF    Collection Time: 01/05/21  9:06 PM   Result Value Ref Range    WBC 7.7 4.1 - 11.1 K/uL    RBC 4.81 4.10 - 5.70 M/uL    HGB 14.6 12.1 - 17.0 g/dL    HCT 42.7 36.6 - 50.3 %    MCV 88.8 80.0 - 99.0 FL    MCH 30.4 26.0 - 34.0 PG    MCHC 34.2 30.0 - 36.5 g/dL    RDW 13.5 11.5 - 14.5 %    PLATELET 874 593 - 316 K/uL    MPV 9.3 8.9 - 12.9 FL    NRBC 0.0 0  WBC    ABSOLUTE NRBC 0.00 0.00 - 0.01 K/uL    NEUTROPHILS 71 32 - 75 %    LYMPHOCYTES 17 12 - 49 %    MONOCYTES 10 5 - 13 %    EOSINOPHILS 2 0 - 7 %    BASOPHILS 0 0 - 1 %    IMMATURE GRANULOCYTES 0 0.0 - 0.5 %    ABS. NEUTROPHILS 5.4 1.8 - 8.0 K/UL    ABS. LYMPHOCYTES 1.3 0.8 - 3.5 K/UL    ABS. MONOCYTES 0.8 0.0 - 1.0 K/UL    ABS. EOSINOPHILS 0.2 0.0 - 0.4 K/UL    ABS. BASOPHILS 0.0 0.0 - 0.1 K/UL    ABS. IMM. GRANS. 0.0 0.00 - 0.04 K/UL    DF AUTOMATED           Assessment:     Active Problems:    Fever (12/29/2020)      AMS (altered mental status) (12/29/2020)      Encephalopathy acute (12/29/2020)    Possible aspiration pneumonia transition patient to p.o. Augmentin  Dementia with agitation Posey bed   Plan:      for placement.   Consult psychiatry  Adjust medications    Signed By: Rom Agarwal MD     January 6, 2021

## 2021-01-06 NOTE — CONSULTS
Cardiology Consult    NAME: Amber Anthony   :  1940   MRN:  [id number]     Date/Time:  2021 8:33 AM    Patient PCP: Natalya Hicks MD  ________________________________________________________________________     Assessment/Plan:   Cardiac dysrhythmia, paroxysmal A. fib, heart rate varies from 34-130s, now controlled on beta-blockade, lowest heart rate was during sleep while patient was snoring, currently in sinus rhythm. We will check TSH. Discontinue Lovenox. Start Eliquis    Hypertension, well controlled on amlodipine    Altered mental status/dementia? Psych history    History of coronary artery disease, details not known            Subjective:   CHIEF COMPLAINT:     Cardiac dysrhythmia  REASON FOR CONSULT:      HISTORY OF PRESENT ILLNESS:     Amber Anthony is a 68 y.o. WHITE [place]  male who presented from nursing home with altered mental status, baseline not known. Patient is currently awake alert, denies chest pain or shortness of breath. Patient is in [place] bed. Permitted me to examine him though would not follow commands. He does have a past medical history of Alzheimer's, coronary artery disease, dementia, psychiatric disorder. Details of these are not known. Patient is not able to give any history. Past Medical History:   Diagnosis Date    Alzheimer disease ([facility] [address]     CAD (coronary artery disease)     Dementia ([facility]     Psychiatric disorder       History reviewed. No pertinent surgical history. No Known Allergies   Meds:  See below  Social History     Tobacco Use    Smoking status: Not on file   Substance Use Topics    Alcohol use: Not Currently      History reviewed. No pertinent family history. REVIEW OF SYSTEMS:     [x]         Unable to obtain  ROS due to ---   []         Total of 12 systems reviewed as follows:      Pertinent Positives include :    Objective:      Physical Exam:    Last 24hrs VS reviewed since prior progress note.  Most recent are:    Visit Vitals  /89 (BP 1 Location: Left arm, BP Patient Position: At rest;Sitting;Head of bed elevated (Comment degrees))   Pulse 95   Temp 98 °F (36.7 °C)   Resp 18   Ht 5' 10\" (1.778 m)   Wt 124.3 kg (274 lb)   SpO2 100%   BMI 39.31 kg/m²     No intake or output data in the 24 hours ending 01/06/21 0833     General Appearance: Well developed, alert, no acute distress. Ears/Nose/Mouth/Throat:  Hearing grossly normal.  Neck: Supple. JVP within normal limits. Carotids good upstrokes, with no bruit. Chest: Lungs clear to auscultation bilaterally. Cardiovascular: Regular rate and rhythm, S1S2 normal, no murmur, rubs, gallops. Abdomen: Soft, non-tender, bowel sounds present   Extremities: No edema bilaterally. Skin: Warm and dry. Neuro: Alert  Psychiatric: Cooperative    []    Data:      Telemetry:    EKG:  []  No new EKG for review.     Telemetry strips in chart are reviewed, with paroxysmal A. fib, currently in sinus rhythm heart rate varies from 29 to 130  Prior to Admission medications    Not on File       Recent Results (from the past 24 hour(s))   METABOLIC PANEL, BASIC    Collection Time: 01/05/21  9:06 PM   Result Value Ref Range    Sodium 144 136 - 145 mmol/L    Potassium 3.5 3.5 - 5.1 mmol/L    Chloride 110 (H) 97 - 108 mmol/L    CO2 27 21 - 32 mmol/L    Anion gap 7 5 - 15 mmol/L    Glucose 107 (H) 65 - 100 mg/dL    BUN 24 (H) 6 - 20 mg/dL    Creatinine 1.64 (H) 0.70 - 1.30 mg/dL    BUN/Creatinine ratio 15 12 - 20      GFR est AA 49 (L) >60 ml/min/1.73m2    GFR est non-AA 41 (L) >60 ml/min/1.73m2    Calcium 9.2 8.5 - 10.1 mg/dL   CBC WITH AUTOMATED DIFF    Collection Time: 01/05/21  9:06 PM   Result Value Ref Range    WBC 7.7 4.1 - 11.1 K/uL    RBC 4.81 4.10 - 5.70 M/uL    HGB 14.6 12.1 - 17.0 g/dL    HCT 42.7 36.6 - 50.3 %    MCV 88.8 80.0 - 99.0 FL    MCH 30.4 26.0 - 34.0 PG    MCHC 34.2 30.0 - 36.5 g/dL    RDW 13.5 11.5 - 14.5 %    PLATELET 830 843 - 247 K/uL    MPV 9.3 8.9 - 12.9 FL    NRBC 0.0 0  WBC    ABSOLUTE NRBC 0.00 0.00 - 0.01 K/uL    NEUTROPHILS 71 32 - 75 %    LYMPHOCYTES 17 12 - 49 %    MONOCYTES 10 5 - 13 %    EOSINOPHILS 2 0 - 7 %    BASOPHILS 0 0 - 1 %    IMMATURE GRANULOCYTES 0 0.0 - 0.5 %    ABS. NEUTROPHILS 5.4 1.8 - 8.0 K/UL    ABS. LYMPHOCYTES 1.3 0.8 - 3.5 K/UL    ABS. MONOCYTES 0.8 0.0 - 1.0 K/UL    ABS. EOSINOPHILS 0.2 0.0 - 0.4 K/UL    ABS. BASOPHILS 0.0 0.0 - 0.1 K/UL    ABS. IMM.  GRANS. 0.0 0.00 - 0.04 K/UL     Yaima Verduzco MD

## 2021-01-06 NOTE — PROGRESS NOTES
PT treatment attempted at [phone] however, patient currently attempting to get out of posey bed and yelling at therapist to change the oil filter. Patient thinks he's at home in his car. Attempted to redirect, however patient not listening to therapist. Will continue to follow pt and will attempt treatment at a later time.  Thank you

## 2021-01-06 NOTE — PROGRESS NOTES
Comprehensive Nutrition Assessment    Type and Reason for Visit: Initial(dysphagia)    Nutrition Recommendations/Plan:     Continue NDD2 Ground/thin diet  Allow extra pudding, applesauce, yogurts  Monitor need for SLP eval     Nursing to document %meal intakes in I/Os    Nutrition Assessment:  Admitted for fever, AMS. Per chart review, NH staff reported pt more confused from baseline. Combative with staff, required restraints, [facility] On COVID r/o precautions in preparation for discharge. Ordered Cardiac diet throughout admit. Per discussion with RN yesterday, pt intakes throughout admit ~50%, mostly d/t agitation and some concern for pt refusing to chew despite ability. Swallows thin liqs without issue. Stated pt to benefit from Huan Xiong - ordered yesterday. RN denied need for SLP intervention at this time d/t intact c/s ability. On f/u today, pt eating well, consumed 95% of breakfast this morning. Labs BUN 24, Cr 1.64, . Meds: bisacodyl, haldol. Malnutrition Assessment:  Malnutrition Status:  Mild malnutrition    Context:  Acute illness     Findings of the 6 clinical characteristics of malnutrition:   Energy Intake:  1 - 75% or less of est energy req for 7 or more days(d/t agitation)  Weight Loss:  No significant weight loss     Body Fat Loss:  Unable to assess,     Muscle Mass Loss:  Unable to assess,    Fluid Accumulation:  No significant fluid accumulation,      Nutrition Related Findings:  NFPE not performed. Appears well nourished, obese per RN. No n/v or c/d. BM documented 1/4. No edema. Wounds:    (redness to elbow, hip, no open wounds)       Current Nutrition Therapies:  DIET DYSPHAGIA MECH ALTERED (NDD2)    Anthropometric Measures:  · Height:  5' 10\" (177.8 cm)  · Current Body Wt:  124.3 kg (274 lb 0.5 oz)   · Usual Body Wt:  (NAM)     · Ideal Body Wt:  166 lbs:  165.1 %   · BMI Category:  Obese class 2 (BMI 35.0-39. 9)     Wt Readings from Last 3 Encounters:   12/28/20 124.3 kg (274 lb)   12/05/20 124.3 kg (274 lb)   11/28/20 122.5 kg (270 lb)     Nutrition Diagnosis:   · Biting/chewing (masticatory) difficulty related to cognitive or neurological impairment as evidenced by (RN rec'sanaz Silvestre [facility])      Nutrition Interventions:   Food and/or Nutrient Delivery: Continue current diet  Nutrition Education and Counseling: Education not indicated  Coordination of Nutrition Care: Continue to monitor while inpatient    Goals:  Meet >75% EENs x 5-7 days  Maintain skin integrity       Nutrition Monitoring and Evaluation:   Behavioral-Environmental Outcomes: None identified  Food/Nutrient Intake Outcomes: Food and nutrient intake  Physical Signs/Symptoms Outcomes: Chewing or swallowing, GI status, Skin    Discharge Planning:    No discharge needs at this time     Electronically signed by Leo Cuellar on 1/6/2021 at 12:23 PM    Contact: [id number]

## 2021-01-06 NOTE — PROGRESS NOTES
LOS assessment performed by myself and Justin Mora LPN. Pt has some excoriation to the bilateral groin. Otherwise, skin clean, dry, and intact.

## 2021-01-07 PROCEDURE — 74011250636 HC RX REV CODE- 250/636: Performed by: INTERNAL MEDICINE

## 2021-01-07 PROCEDURE — 74011250637 HC RX REV CODE- 250/637: Performed by: INTERNAL MEDICINE

## 2021-01-07 PROCEDURE — 65270000029 HC RM PRIVATE

## 2021-01-07 RX ADMIN — OLANZAPINE 10 MG: 5 TABLET, ORALLY DISINTEGRATING ORAL at 08:33

## 2021-01-07 RX ADMIN — METOPROLOL SUCCINATE 25 MG: 25 TABLET, EXTENDED RELEASE ORAL at 08:33

## 2021-01-07 RX ADMIN — APIXABAN 2.5 MG: 2.5 TABLET, FILM COATED ORAL at 21:36

## 2021-01-07 RX ADMIN — AMOXICILLIN AND CLAVULANATE POTASSIUM 1 TABLET: 500; 125 TABLET, FILM COATED ORAL at 08:33

## 2021-01-07 RX ADMIN — APIXABAN 2.5 MG: 2.5 TABLET, FILM COATED ORAL at 08:33

## 2021-01-07 RX ADMIN — AMLODIPINE BESYLATE 5 MG: 5 TABLET ORAL at 08:33

## 2021-01-07 RX ADMIN — AMOXICILLIN AND CLAVULANATE POTASSIUM 1 TABLET: 500; 125 TABLET, FILM COATED ORAL at 21:36

## 2021-01-07 RX ADMIN — OLANZAPINE 10 MG: 5 TABLET, ORALLY DISINTEGRATING ORAL at 21:36

## 2021-01-07 RX ADMIN — HALOPERIDOL LACTATE 1 MG: 5 INJECTION, SOLUTION INTRAMUSCULAR at 08:33

## 2021-01-07 NOTE — PROGRESS NOTES
Progress Note    Patient: Marie Field MRN: [id number]  SSN: [id number]    YOB: 1940  Age: 68 y.o. Sex: male      Admit Date: 12/28/2020    LOS: 9 days     Subjective:     Patient appears to be confused is in restraints    Objective:     Vitals:    01/06/21 2128 01/07/21 0142 01/07/21 0934 01/07/21 1555   BP: 135/72 (!) 143/74 (!) 142/85 132/71   Pulse: 72 87 89 79   Resp: 20 18 18 20   Temp: 98.6 °F (37 °C) 99 °F (37.2 °C) 98.2 °F (36.8 °C) 97.5 °F (36.4 °C)   SpO2: 95% 95% 95% 91%   Weight:       Height:            Intake and Output:  Current Shift: No intake/output data recorded. Last three shifts: 01/05 1901 - 01/07 0700  In: 120 [P.O.:120]  Out: -     Physical Exam:   General:     Eyes:  Conjunctivae/corneas clear. PERRL, EOMs intact. Fundi benign   Ears:  Normal TMs and external ear canals both ears. Nose: Nares normal. Septum midline. Mucosa normal. No drainage or sinus tenderness. Mouth/Throat: Lips, mucosa, and tongue normal. Teeth and gums normal.   Neck: Supple, symmetrical, trachea midline, no adenopathy, thyroid: no enlargment/tenderness/nodules, no carotid bruit and no JVD. Back:   Symmetric, no curvature. ROM normal. No CVA tenderness. Lungs:   Clear to auscultation bilaterally. Heart:  Regular rate and rhythm, S1, S2 normal, no murmur, click, rub or gallop. Abdomen:   Soft, non-tender. Bowel sounds normal. No masses,  No organomegaly. Extremities: Extremities normal, atraumatic, no cyanosis or edema. Pulses: 2+ and symmetric all extremities. Skin: Skin color, texture, turgor normal. No rashes or lesions   Lymph nodes: Cervical, supraclavicular, and axillary nodes normal.   Neurologic:        Lab/Data Review: All lab results for the last 24 hours reviewed. No results found for this or any previous visit (from the past 24 hour(s)).       Assessment:     Active Problems:    Fever (12/29/2020)      AMS (altered mental status) (12/29/2020) Encephalopathy acute (12/29/2020)    Possible aspiration pneumonia transition patient to p.o. Augmentin  Dementia with agitation [place] bed   Plan:      for placement.   Consult psychiatry  Adjust medications    Signed By: Jovita Dykes MD     January 7, 2021

## 2021-01-08 LAB — TSH SERPL DL<=0.05 MIU/L-ACNC: 1.62 UIU/ML (ref 0.36–3.74)

## 2021-01-08 PROCEDURE — 84443 ASSAY THYROID STIM HORMONE: CPT

## 2021-01-08 PROCEDURE — 74011250637 HC RX REV CODE- 250/637: Performed by: PSYCHIATRY & NEUROLOGY

## 2021-01-08 PROCEDURE — 74011250637 HC RX REV CODE- 250/637: Performed by: INTERNAL MEDICINE

## 2021-01-08 PROCEDURE — 65270000029 HC RM PRIVATE

## 2021-01-08 PROCEDURE — 74011250636 HC RX REV CODE- 250/636: Performed by: INTERNAL MEDICINE

## 2021-01-08 PROCEDURE — 36415 COLL VENOUS BLD VENIPUNCTURE: CPT

## 2021-01-08 PROCEDURE — 82607 VITAMIN B-12: CPT

## 2021-01-08 RX ORDER — OLANZAPINE 2.5 MG/1
2.5 TABLET ORAL DAILY
Status: DISCONTINUED | OUTPATIENT
Start: 2021-01-09 | End: 2021-01-11

## 2021-01-08 RX ORDER — HYDROXYZINE PAMOATE 25 MG/1
25 CAPSULE ORAL
Status: DISCONTINUED | OUTPATIENT
Start: 2021-01-08 | End: 2021-01-26 | Stop reason: HOSPADM

## 2021-01-08 RX ADMIN — HYDROXYZINE PAMOATE 25 MG: 25 CAPSULE ORAL at 20:31

## 2021-01-08 RX ADMIN — OLANZAPINE 10 MG: 5 TABLET, ORALLY DISINTEGRATING ORAL at 09:42

## 2021-01-08 RX ADMIN — AMOXICILLIN AND CLAVULANATE POTASSIUM 1 TABLET: 500; 125 TABLET, FILM COATED ORAL at 09:41

## 2021-01-08 RX ADMIN — APIXABAN 2.5 MG: 2.5 TABLET, FILM COATED ORAL at 20:31

## 2021-01-08 RX ADMIN — AMLODIPINE BESYLATE 5 MG: 5 TABLET ORAL at 09:42

## 2021-01-08 RX ADMIN — HALOPERIDOL LACTATE 1 MG: 5 INJECTION, SOLUTION INTRAMUSCULAR at 00:31

## 2021-01-08 RX ADMIN — HALOPERIDOL LACTATE 1 MG: 5 INJECTION, SOLUTION INTRAMUSCULAR at 20:31

## 2021-01-08 RX ADMIN — METOPROLOL SUCCINATE 25 MG: 25 TABLET, EXTENDED RELEASE ORAL at 09:41

## 2021-01-08 RX ADMIN — APIXABAN 2.5 MG: 2.5 TABLET, FILM COATED ORAL at 09:41

## 2021-01-08 RX ADMIN — AMOXICILLIN AND CLAVULANATE POTASSIUM 1 TABLET: 500; 125 TABLET, FILM COATED ORAL at 20:31

## 2021-01-08 NOTE — PROGRESS NOTES
Physician Progress Note      Edwina Su  CSN #:                  [id number]  :                       1940  ADMIT DATE:       2020 10:36 PM  [address] [place] DATE:  RESPONDING  PROVIDER #:        Ledy Lucas MD          QUERY TEXT:    Dr. Gaudencio Berry,  Pt admitted with aspiration pneumonia and has encephalopathy documented. If possible, please document in progress notes and discharge summary further specificity regarding the type of encephalopathy:      The medical record reflects the following:  Risk Factors: aspiration pneumonia, fever, endstage dementia with agitation  Clinical Indicators: CT head - No acute intracranial abnormality. Treatment: IV Zosyn for aspiration pneumonia    Thank you. Elliot Bobby, RN  Options provided:  -- Metabolic encephalopathy  -- Septic encephalopathy  -- Other - I will add my own diagnosis  -- Disagree - Not applicable / Not valid  -- Disagree - Clinically unable to determine / Unknown  -- Refer to Clinical Documentation Reviewer    PROVIDER RESPONSE TEXT:    This patient has metabolic encephalopathy. Query created by:  David Parson on 2021 10:30 AM      Electronically signed by:  Ledy Lucas MD 2021 1:36 PM

## 2021-01-08 NOTE — PROGRESS NOTES
Progress Note    Patient: Kobi Grijalva MRN: [id number]  SSN: [id number]    YOB: 1940  Age: 68 y.o. Sex: male      Admit Date: 12/28/2020    LOS: 10 days     Subjective:     Patient is confused naked. Psychiatric evaluation pending    Objective:     Vitals:    01/07/21 1555 01/07/21 2103 01/07/21 2345 01/08/21 0722   BP: 132/71 (!) 141/73 (!) 140/78 (!) 145/69   Pulse: 79 85 86 61   Resp: 20 18 18 17   Temp: 97.5 °F (36.4 °C) 98.6 °F (37 °C) 97.8 °F (36.6 °C) 97.6 °F (36.4 °C)   SpO2: 91% 94% 95% 96%   Weight:       Height:            Intake and Output:  Current Shift: No intake/output data recorded. Last three shifts: 01/06 1901 - 01/08 0700  In: 800 [P.O.:800]  Out: -     Physical Exam:   General:  Alert, cooperative, no distress, appears stated age. Eyes:  Conjunctivae/corneas clear. PERRL, EOMs intact. Fundi benign   Ears:  Normal TMs and external ear canals both ears. Nose: Nares normal. Septum midline. Mucosa normal. No drainage or sinus tenderness. Mouth/Throat: Lips, mucosa, and tongue normal. Teeth and gums normal.   Neck: Supple, symmetrical, trachea midline, no adenopathy, thyroid: no enlargment/tenderness/nodules, no carotid bruit and no JVD. Back:   Symmetric, no curvature. ROM normal. No CVA tenderness. Lungs:   Clear to auscultation bilaterally. Heart:  Regular rate and rhythm, S1, S2 normal, no murmur, click, rub or gallop. Abdomen:   Soft, non-tender. Bowel sounds normal. No masses,  No organomegaly. Extremities: Extremities normal, atraumatic, no cyanosis or edema. Pulses: 2+ and symmetric all extremities. Skin: Skin color, texture, turgor normal. No rashes or lesions   Lymph nodes: Cervical, supraclavicular, and axillary nodes normal.   Neurologic: CNII-XII intact. Normal strength, sensation and reflexes throughout. Lab/Data Review: All lab results for the last 24 hours reviewed.      No results found for this or any previous visit (from the past 24 hour(s)).       Assessment:     Active Problems:    Fever (12/29/2020)      AMS (altered mental status) (12/29/2020)      Encephalopathy acute (12/29/2020)    Dementia end-stage with agitation consult psychiatry    Plan:     Obtain ammonia level    Signed By: Jose Elias Gilbert MD     January 8, 2021

## 2021-01-09 LAB
ANION GAP SERPL CALC-SCNC: 5 MMOL/L (ref 5–15)
ANION GAP SERPL CALC-SCNC: 6 MMOL/L (ref 5–15)
BASOPHILS # BLD: 0 K/UL (ref 0–0.1)
BASOPHILS NFR BLD: 0 % (ref 0–1)
BUN SERPL-MCNC: 43 MG/DL (ref 6–20)
BUN SERPL-MCNC: 49 MG/DL (ref 6–20)
BUN/CREAT SERPL: 28 (ref 12–20)
BUN/CREAT SERPL: 30 (ref 12–20)
CA-I BLD-MCNC: 9.2 MG/DL (ref 8.5–10.1)
CA-I BLD-MCNC: 9.2 MG/DL (ref 8.5–10.1)
CHLORIDE SERPL-SCNC: 112 MMOL/L (ref 97–108)
CHLORIDE SERPL-SCNC: 113 MMOL/L (ref 97–108)
CO2 SERPL-SCNC: 27 MMOL/L (ref 21–32)
CO2 SERPL-SCNC: 28 MMOL/L (ref 21–32)
CREAT SERPL-MCNC: 1.53 MG/DL (ref 0.7–1.3)
CREAT SERPL-MCNC: 1.61 MG/DL (ref 0.7–1.3)
DIFFERENTIAL METHOD BLD: NORMAL
EOSINOPHIL # BLD: 0.2 K/UL (ref 0–0.4)
EOSINOPHIL NFR BLD: 3 % (ref 0–7)
ERYTHROCYTE [DISTWIDTH] IN BLOOD BY AUTOMATED COUNT: 13.8 % (ref 11.5–14.5)
FOLATE SERPL-MCNC: 5.2 NG/ML (ref 5–21)
GLUCOSE BLD STRIP.AUTO-MCNC: 114 MG/DL (ref 65–100)
GLUCOSE BLD STRIP.AUTO-MCNC: 136 MG/DL (ref 65–100)
GLUCOSE BLD STRIP.AUTO-MCNC: 95 MG/DL (ref 65–100)
GLUCOSE SERPL-MCNC: 90 MG/DL (ref 65–100)
GLUCOSE SERPL-MCNC: 93 MG/DL (ref 65–100)
HCT VFR BLD AUTO: 42.4 % (ref 36.6–50.3)
HGB BLD-MCNC: 14.1 G/DL (ref 12.1–17)
IMM GRANULOCYTES # BLD AUTO: 0 K/UL (ref 0–0.04)
IMM GRANULOCYTES NFR BLD AUTO: 0 % (ref 0–0.5)
LYMPHOCYTES # BLD: 0.9 K/UL (ref 0.8–3.5)
LYMPHOCYTES NFR BLD: 14 % (ref 12–49)
MCH RBC QN AUTO: 30.4 PG (ref 26–34)
MCHC RBC AUTO-ENTMCNC: 33.3 G/DL (ref 30–36.5)
MCV RBC AUTO: 91.4 FL (ref 80–99)
MONOCYTES # BLD: 0.5 K/UL (ref 0–1)
MONOCYTES NFR BLD: 8 % (ref 5–13)
NEUTS SEG # BLD: 4.9 K/UL (ref 1.8–8)
NEUTS SEG NFR BLD: 75 % (ref 32–75)
PERFORMED BY, TECHID: ABNORMAL
PERFORMED BY, TECHID: ABNORMAL
PERFORMED BY, TECHID: NORMAL
PLATELET # BLD AUTO: 279 K/UL (ref 150–400)
PMV BLD AUTO: 10.1 FL (ref 8.9–12.9)
POTASSIUM SERPL-SCNC: 3.7 MMOL/L (ref 3.5–5.1)
POTASSIUM SERPL-SCNC: 4.1 MMOL/L (ref 3.5–5.1)
RBC # BLD AUTO: 4.64 M/UL (ref 4.1–5.7)
SODIUM SERPL-SCNC: 144 MMOL/L (ref 136–145)
SODIUM SERPL-SCNC: 147 MMOL/L (ref 136–145)
VIT B12 SERPL-MCNC: 421 PG/ML (ref 193–986)
WBC # BLD AUTO: 6.6 K/UL (ref 4.1–11.1)

## 2021-01-09 PROCEDURE — 65270000029 HC RM PRIVATE

## 2021-01-09 PROCEDURE — 74011250636 HC RX REV CODE- 250/636: Performed by: INTERNAL MEDICINE

## 2021-01-09 PROCEDURE — 74011250637 HC RX REV CODE- 250/637: Performed by: INTERNAL MEDICINE

## 2021-01-09 PROCEDURE — 36415 COLL VENOUS BLD VENIPUNCTURE: CPT

## 2021-01-09 PROCEDURE — 80048 BASIC METABOLIC PNL TOTAL CA: CPT

## 2021-01-09 PROCEDURE — 74011250637 HC RX REV CODE- 250/637: Performed by: PSYCHIATRY & NEUROLOGY

## 2021-01-09 PROCEDURE — 97530 THERAPEUTIC ACTIVITIES: CPT

## 2021-01-09 PROCEDURE — 82962 GLUCOSE BLOOD TEST: CPT

## 2021-01-09 PROCEDURE — 74011250637 HC RX REV CODE- 250/637: Performed by: NURSE PRACTITIONER

## 2021-01-09 PROCEDURE — 85025 COMPLETE CBC W/AUTO DIFF WBC: CPT

## 2021-01-09 RX ADMIN — OLANZAPINE 2.5 MG: 2.5 TABLET, FILM COATED ORAL at 09:18

## 2021-01-09 RX ADMIN — APIXABAN 2.5 MG: 2.5 TABLET, FILM COATED ORAL at 09:18

## 2021-01-09 RX ADMIN — HYDRALAZINE HYDROCHLORIDE 25 MG: 25 TABLET, FILM COATED ORAL at 04:24

## 2021-01-09 RX ADMIN — HALOPERIDOL LACTATE 1 MG: 5 INJECTION, SOLUTION INTRAMUSCULAR at 20:33

## 2021-01-09 RX ADMIN — HYDROXYZINE PAMOATE 25 MG: 25 CAPSULE ORAL at 04:24

## 2021-01-09 RX ADMIN — HYDROXYZINE PAMOATE 25 MG: 25 CAPSULE ORAL at 20:33

## 2021-01-09 RX ADMIN — APIXABAN 2.5 MG: 2.5 TABLET, FILM COATED ORAL at 20:33

## 2021-01-09 RX ADMIN — AMLODIPINE BESYLATE 5 MG: 5 TABLET ORAL at 09:18

## 2021-01-09 RX ADMIN — AMOXICILLIN AND CLAVULANATE POTASSIUM 1 TABLET: 500; 125 TABLET, FILM COATED ORAL at 20:33

## 2021-01-09 RX ADMIN — AMOXICILLIN AND CLAVULANATE POTASSIUM 1 TABLET: 500; 125 TABLET, FILM COATED ORAL at 09:18

## 2021-01-09 RX ADMIN — METOPROLOL SUCCINATE 25 MG: 25 TABLET, EXTENDED RELEASE ORAL at 09:18

## 2021-01-09 NOTE — PROGRESS NOTES
Problem: Non-Violent Restraints  Goal: *Removal from restraints as soon as assessed to be safe  Outcome: Progressing Towards Goal  Goal: *No harm/injury to patient while restraints in use  Outcome: Progressing Towards Goal  Goal: *Patient's dignity will be maintained  Outcome: Progressing Towards Goal  Goal: *Patient Specific Goal (EDIT GOAL, INSERT TEXT)  Outcome: Progressing Towards Goal  Goal: Non-violent Restaints:Standard Interventions  Outcome: Progressing Towards Goal  Goal: Non-violent Restraints:Patient Interventions  Outcome: Progressing Towards Goal  Goal: Patient/Family Education  Outcome: Progressing Towards Goal     Problem: Falls - Risk of  Goal: *Absence of Falls  Description: Document Alondra Fall Risk and appropriate interventions in the flowsheet. Outcome: Progressing Towards Goal  Note: Fall Risk Interventions:  Mobility Interventions: Bed/chair exit alarm    Mentation Interventions: Adequate sleep, hydration, pain control, Bed/chair exit alarm, More frequent rounding    Medication Interventions: Bed/chair exit alarm    Elimination Interventions: Bed/chair exit alarm, Toileting schedule/hourly rounds    History of Falls Interventions: Bed/chair exit alarm, Investigate reason for fall, Room close to nurse's station         Problem: Patient Education: Go to Patient Education Activity  Goal: Patient/Family Education  Outcome: Progressing Towards Goal     Problem: Pressure Injury - Risk of  Goal: *Prevention of pressure injury  Description: Document Carlyle Scale and appropriate interventions in the flowsheet. Outcome: Progressing Towards Goal  Note: Pressure Injury Interventions:  Sensory Interventions: Assess changes in LOC, Maintain/enhance activity level, Keep linens dry and wrinkle-free, Minimize linen layers, Turn and reposition approx.  every two hours (pillows and wedges if needed)    Moisture Interventions: Absorbent underpads, Apply protective barrier, creams and emollients, Minimize layers, Maintain skin hydration (lotion/cream)    Activity Interventions: PT/OT evaluation, Assess need for specialty bed    Mobility Interventions: HOB 30 degrees or less, PT/OT evaluation, Turn and reposition approx.  every two hours(pillow and wedges)    Nutrition Interventions: Document food/fluid/supplement intake, Offer support with meals,snacks and hydration    Friction and Shear Interventions: Apply protective barrier, creams and emollients, Minimize layers, HOB 30 degrees or less                Problem: Patient Education: Go to Patient Education Activity  Goal: Patient/Family Education  Outcome: Progressing Towards Goal     Problem: Patient Education: Go to Patient Education Activity  Goal: Patient/Family Education  Outcome: Progressing Towards Goal

## 2021-01-09 NOTE — PROGRESS NOTES
Progress Note    Patient: Therese Portillo MRN: [id number]  SSN: [id number]    YOB: 1940  Age: 68 y.o. Sex: male      Admit Date: 12/28/2020    LOS: 11 days     Subjective:     Patient is confused naked. Psychiatric evaluation pending    Objective:     Vitals:    01/08/21 0722 01/08/21 1534 01/09/21 0424 01/09/21 0754   BP: (!) 145/69 (!) 161/77 (!) 165/82 (!) 154/69   Pulse: 61 98 92 (!) 102   Resp: 17 18  18   Temp: 97.6 °F (36.4 °C) 97.4 °F (36.3 °C)  97.9 °F (36.6 °C)   SpO2: 96% 99%  96%   Weight:       Height:            Intake and Output:  Current Shift: No intake/output data recorded. Last three shifts: 01/07 1901 - 01/09 0700  In: 800 [P.O.:800]  Out: -     Physical Exam:   General:   Patient is drowsy   Eyes:  Conjunctivae/corneas clear. PERRL, EOMs intact. Fundi benign   Ears:  Normal TMs and external ear canals both ears. Nose: Nares normal. Septum midline. Mucosa normal. No drainage or sinus tenderness. Mouth/Throat: Lips, mucosa, and tongue normal. Teeth and gums normal.   Neck: Supple, symmetrical, trachea midline, no adenopathy, thyroid: no enlargment/tenderness/nodules, no carotid bruit and no JVD. Back:   Symmetric, no curvature. ROM normal. No CVA tenderness. Lungs:   Clear to auscultation bilaterally. Heart:  Regular rate and rhythm, S1, S2 normal, no murmur, click, rub or gallop. Abdomen:   Soft, non-tender. Bowel sounds normal. No masses,  No organomegaly. Extremities: Extremities normal, atraumatic, no cyanosis or edema. Pulses: 2+ and symmetric all extremities. Skin: Skin color, texture, turgor normal. No rashes or lesions   Lymph nodes: Cervical, supraclavicular, and axillary nodes normal.   Neurologic:  Patient is drowsy.      Recent Results (from the past 24 hour(s))   TSH 3RD GENERATION    Collection Time: 01/08/21  6:10 PM   Result Value Ref Range    TSH 1.62 0.36 - 0.49 uIU/mL   METABOLIC PANEL, BASIC    Collection Time: 01/09/21  8:40 AM Result Value Ref Range    Sodium 147 (H) 136 - 145 mmol/L    Potassium 3.7 3.5 - 5.1 mmol/L    Chloride 113 (H) 97 - 108 mmol/L    CO2 28 21 - 32 mmol/L    Anion gap 6 5 - 15 mmol/L    Glucose 90 65 - 100 mg/dL    BUN 43 (H) 6 - 20 mg/dL    Creatinine 1.53 (H) 0.70 - 1.30 mg/dL    BUN/Creatinine ratio 28 (H) 12 - 20      GFR est AA 54 (L) >60 ml/min/1.73m2    GFR est non-AA 44 (L) >60 ml/min/1.73m2    Calcium 9.2 8.5 - 10.1 mg/dL   CBC WITH AUTOMATED DIFF    Collection Time: 01/09/21  8:40 AM   Result Value Ref Range    WBC 6.6 4.1 - 11.1 K/uL    RBC 4.64 4.10 - 5.70 M/uL    HGB 14.1 12.1 - 17.0 g/dL    HCT 42.4 36.6 - 50.3 %    MCV 91.4 80.0 - 99.0 FL    MCH 30.4 26.0 - 34.0 PG    MCHC 33.3 30.0 - 36.5 g/dL    RDW 13.8 11.5 - 14.5 %    PLATELET 474 350 - 658 K/uL    MPV 10.1 8.9 - 12.9 FL    NEUTROPHILS 75 32 - 75 %    LYMPHOCYTES 14 12 - 49 %    MONOCYTES 8 5 - 13 %    EOSINOPHILS 3 0 - 7 %    BASOPHILS 0 0 - 1 %    IMMATURE GRANULOCYTES 0 0.0 - 0.5 %    ABS. NEUTROPHILS 4.9 1.8 - 8.0 K/UL    ABS. LYMPHOCYTES 0.9 0.8 - 3.5 K/UL    ABS. MONOCYTES 0.5 0.0 - 1.0 K/UL    ABS. EOSINOPHILS 0.2 0.0 - 0.4 K/UL    ABS. BASOPHILS 0.0 0.0 - 0.1 K/UL    ABS. IMM. GRANS. 0.0 0.00 - 0.04 K/UL    DF AUTOMATED     GLUCOSE, POC    Collection Time: 01/09/21 11:09 AM   Result Value Ref Range    Glucose (POC) 114 (H) 65 - 100 mg/dL    Performed by Juan Austin          Lab/Data Review: All lab results for the last 24 hours reviewed.      Recent Results (from the past 24 hour(s))   TSH 3RD GENERATION    Collection Time: 01/08/21  6:10 PM   Result Value Ref Range    TSH 1.62 0.36 - 7.64 uIU/mL   METABOLIC PANEL, BASIC    Collection Time: 01/09/21  8:40 AM   Result Value Ref Range    Sodium 147 (H) 136 - 145 mmol/L    Potassium 3.7 3.5 - 5.1 mmol/L    Chloride 113 (H) 97 - 108 mmol/L    CO2 28 21 - 32 mmol/L    Anion gap 6 5 - 15 mmol/L    Glucose 90 65 - 100 mg/dL    BUN 43 (H) 6 - 20 mg/dL    Creatinine 1.53 (H) 0.70 - 1.30 mg/dL BUN/Creatinine ratio 28 (H) 12 - 20      GFR est AA 54 (L) >60 ml/min/1.73m2    GFR est non-AA 44 (L) >60 ml/min/1.73m2    Calcium 9.2 8.5 - 10.1 mg/dL   CBC WITH AUTOMATED DIFF    Collection Time: 01/09/21  8:40 AM   Result Value Ref Range    WBC 6.6 4.1 - 11.1 K/uL    RBC 4.64 4.10 - 5.70 M/uL    HGB 14.1 12.1 - 17.0 g/dL    HCT 42.4 36.6 - 50.3 %    MCV 91.4 80.0 - 99.0 FL    MCH 30.4 26.0 - 34.0 PG    MCHC 33.3 30.0 - 36.5 g/dL    RDW 13.8 11.5 - 14.5 %    PLATELET 449 622 - 593 K/uL    MPV 10.1 8.9 - 12.9 FL    NEUTROPHILS 75 32 - 75 %    LYMPHOCYTES 14 12 - 49 %    MONOCYTES 8 5 - 13 %    EOSINOPHILS 3 0 - 7 %    BASOPHILS 0 0 - 1 %    IMMATURE GRANULOCYTES 0 0.0 - 0.5 %    ABS. NEUTROPHILS 4.9 1.8 - 8.0 K/UL    ABS. LYMPHOCYTES 0.9 0.8 - 3.5 K/UL    ABS. MONOCYTES 0.5 0.0 - 1.0 K/UL    ABS. EOSINOPHILS 0.2 0.0 - 0.4 K/UL    ABS. BASOPHILS 0.0 0.0 - 0.1 K/UL    ABS. IMM.  GRANS. 0.0 0.00 - 0.04 K/UL    DF AUTOMATED     GLUCOSE, POC    Collection Time: 01/09/21 11:09 AM   Result Value Ref Range    Glucose (POC) 114 (H) 65 - 100 mg/dL    Performed by Monet Hand          Assessment:     Active Problems:    Fever (12/29/2020)      AMS (altered mental status) (12/29/2020)      Encephalopathy acute (12/29/2020)    Dementia end-stage with agitation consult psychiatry    Plan:     Psychiatry evaluation pending    Signed By: Darion Ireland MD     January 9, 2021

## 2021-01-09 NOTE — PROGRESS NOTES
PHYSICAL THERAPY TREATMENT  Patient: Tigist Valentin (53 y.o. male)  Date: 1/9/2021  Diagnosis: AMS (altered mental status) [R41.82]  Fever [R50.9]  Encephalopathy acute [G93.40] <principal problem not specified>       Precautions:  Fall Risk, Impulsive  Chart, physical therapy assessment, plan of care and goals were reviewed. ASSESSMENT  Patient continues with skilled PT services and is progressing towards goals. Pt long sitting naked in Posey bed upon PT arrival, agreeable to session. Pt required direction in donning a gown, but obliged. Pt very interested in subject of trapping animals and required frequent verbal redirection to be able to participate in session. Pt required mod A for bed mobility, mod A for supine <> sit transfers, max A for scooting out of Posey bed, and mod A for sit <> stand transfers with max A to maintain balance due to tendency to posteriorly lean. Pt very unsteady during static balance with use of RW. Pt demonstrated ability to stand for 5 minutes while RN changed sheets and cleaned off bed. Pt was able to take 5 sidesteps with RW towards [facility] before resuming sitting, max A to maintain dynamic balance. Pt demonstrated fair static sitting balance while drinking his tea after standing. Requested that pt lie back in bed, pt insisted that he needed to resume standing to do so, pt stood from bed x2 with RYAN HHA to reposition himself despite cuing to initiate movement by lying on his left side first. Pt required mod A for LLE management to return to supine in bed. [place] bed zippers secured post session. Pt did well with session today currently. Will continue to benefit from skilled PT services, and will continue to progress as tolerated.      Current Level of Function Impacting Discharge (mobility/balance): increased assistance required for all mobility, confusion impacting safety    Other factors to consider for discharge: amount of assistance at nursing home, PLOF, course of medical treatment         PLAN :  Patient continues to benefit from skilled intervention to address the above impairments. Continue treatment per established plan of care to address goals. Recommendation for discharge: (in order for the patient to meet his/her long term goals)  SNF vs LTC depending on PLF    This discharge recommendation:  Has not yet been discussed the attending provider and/or case management    IF patient discharges home will need the following DME: to be determined (TBD)       SUBJECTIVE:   Patient stated If you trap a minx you have to drown them.     OBJECTIVE DATA SUMMARY:   Critical Behavior:  Neurologic State: Alert, Confused, Eyes open spontaneously  Orientation Level: Oriented to person, Disoriented to place, Disoriented to situation, Disoriented to time  Cognition: Decreased attention/concentration, Decreased command following, Impaired decision making, Memory loss, Poor safety awareness  Safety/Judgement: Decreased awareness of environment, Fall prevention  Functional Mobility Training:  Bed Mobility:  Rolling: Moderate assistance  Supine to Sit: Moderate assistance  Sit to Supine: Moderate assistance  Scooting: Maximum assistance        Transfers:  Sit to Stand: Moderate assistance  Stand to Sit: Moderate assistance             Balance:  Sitting: Impaired  Sitting - Static: Fair (occasional)  Sitting - Dynamic: Poor (constant support)  Standing: Impaired; With support  Standing - Static: Poor  Standing - Dynamic : Poor  Ambulation/Gait Training:  Unable to assess due to increased unsteadiness and decreased command following      Pain Rating:  Pt did not report pain during session    Activity Tolerance:   Fair    After treatment patient left in no apparent distress:   Supine in bed, Call bell within reach, and Posey bed secured    GOALS:    Problem: Mobility Impaired (Adult and Pediatric)  Goal: *Acute Goals and Plan of Care (Insert Text)  Description: Pt will be I with LE HEP in 7 days.   Pt will perform bed mobility with CGA in 7 days. Pt will perform transfers with CGA in 7 days. Pt will amb 150 feet with LRAD safely with CGA in 7 days. Outcome: Progressing Towards Goal       COMMUNICATION/COLLABORATION:   The patients plan of care was discussed with: Registered nurse.        Char Curry, PT, DPT   Time Calculation: 33 mins

## 2021-01-09 NOTE — CONSULTS
[facility]    Name:  Geni Colunga  MR#:  [id number]  :  1940  ACCOUNT #:  [de-identified]  DATE OF SERVICE:  2021    PSYCH CONSULT    Date of consult 2021, date seen 2021. REASON FOR CONSULT:   Chart reviewed, spoke with the nursing staff. HISTORY OF PRESENT ILLNESS:  The patient is a 72-year-old   The patient  chart reviewed,  This gentleman is  presented to the with  and fever. This gentleman has a history of Alzheimer's dementia  skilled nursing home, sent to the hospital for this afternoon got a fever. The patient was unable to give history,         PAST HISTORY:   Alzheimer's dementia     SOCIAL HISTORY:   smoking history, unknown. Substance abuse history     ALLERGIES TO MEDICATIONS:  NO KNOWN ALLERGIES     MEDICATIONS:  Amlodipine 5 mg, amoxycillin  metoprolol, olanzapine 5 mg     MENTAL STATUS:      Blood pressure 161/76, pulse 98. LABORATORY DATA:  On the day of admission, CBC unremarkable. Metabolic panel; elevated creatinine now at, BUN  GFR 56, albumin 3.3, globulin unremarkable. Lactic acid 1.4. Blood culture, no growth     DIAGNOSES:  Alzheimer's dementia     DISPOSITION:  He is on olanzapine 5 mg daily, 2.5 mg   I will get a TSH  using p.r.n. medications  Right now, he is not agitated; however, he is tomorrow.       Castro Hernandez MD      [id number]  D:  2021 0:24  T:  2021 5:30  JOB #:  [id number]

## 2021-01-10 LAB
ANION GAP SERPL CALC-SCNC: 6 MMOL/L (ref 5–15)
BUN SERPL-MCNC: 48 MG/DL (ref 6–20)
BUN/CREAT SERPL: 30 (ref 12–20)
CA-I BLD-MCNC: 9.1 MG/DL (ref 8.5–10.1)
CHLORIDE SERPL-SCNC: 113 MMOL/L (ref 97–108)
CO2 SERPL-SCNC: 27 MMOL/L (ref 21–32)
CREAT SERPL-MCNC: 1.61 MG/DL (ref 0.7–1.3)
GLUCOSE SERPL-MCNC: 126 MG/DL (ref 65–100)
POTASSIUM SERPL-SCNC: 3.5 MMOL/L (ref 3.5–5.1)
SODIUM SERPL-SCNC: 146 MMOL/L (ref 136–145)

## 2021-01-10 PROCEDURE — 97530 THERAPEUTIC ACTIVITIES: CPT

## 2021-01-10 PROCEDURE — 65270000029 HC RM PRIVATE

## 2021-01-10 PROCEDURE — 36415 COLL VENOUS BLD VENIPUNCTURE: CPT

## 2021-01-10 PROCEDURE — 74011250637 HC RX REV CODE- 250/637: Performed by: INTERNAL MEDICINE

## 2021-01-10 PROCEDURE — 74011250637 HC RX REV CODE- 250/637: Performed by: PSYCHIATRY & NEUROLOGY

## 2021-01-10 PROCEDURE — 80048 BASIC METABOLIC PNL TOTAL CA: CPT

## 2021-01-10 PROCEDURE — 74011250636 HC RX REV CODE- 250/636: Performed by: INTERNAL MEDICINE

## 2021-01-10 RX ADMIN — AMLODIPINE BESYLATE 5 MG: 5 TABLET ORAL at 09:29

## 2021-01-10 RX ADMIN — APIXABAN 2.5 MG: 2.5 TABLET, FILM COATED ORAL at 19:47

## 2021-01-10 RX ADMIN — APIXABAN 2.5 MG: 2.5 TABLET, FILM COATED ORAL at 09:29

## 2021-01-10 RX ADMIN — HALOPERIDOL LACTATE 1 MG: 5 INJECTION, SOLUTION INTRAMUSCULAR at 05:19

## 2021-01-10 RX ADMIN — AMOXICILLIN AND CLAVULANATE POTASSIUM 1 TABLET: 500; 125 TABLET, FILM COATED ORAL at 09:29

## 2021-01-10 RX ADMIN — METOPROLOL SUCCINATE 25 MG: 25 TABLET, EXTENDED RELEASE ORAL at 09:29

## 2021-01-10 RX ADMIN — OLANZAPINE 2.5 MG: 2.5 TABLET, FILM COATED ORAL at 09:29

## 2021-01-10 RX ADMIN — HYDROXYZINE PAMOATE 25 MG: 25 CAPSULE ORAL at 19:47

## 2021-01-10 RX ADMIN — HYDROXYZINE PAMOATE 25 MG: 25 CAPSULE ORAL at 05:18

## 2021-01-10 RX ADMIN — HALOPERIDOL LACTATE 1 MG: 5 INJECTION, SOLUTION INTRAMUSCULAR at 21:39

## 2021-01-10 RX ADMIN — AMOXICILLIN AND CLAVULANATE POTASSIUM 1 TABLET: 500; 125 TABLET, FILM COATED ORAL at 19:47

## 2021-01-10 NOTE — PROGRESS NOTES
Problem: Self Care Deficits Care Plan (Adult)  Goal: *Acute Goals and Plan of Care (Insert Text)  Description: Pt will be SBA sup<->sit in prep for EOB ADL's  Pt will be SBA  LB dressing EOB level  Pt will be SBA  sit<-> prep for toilet transfer  Pt will be SBA  toilet transfer with LRAD  Pt will be SBA  toileting/cloth mgmt LRAD  Pt will be SBA  grooming standing sink  Pt will be SBA bathing sitting/standing sink LRAD  Pt will be SBA laisha UE HEP in prep for self care tasks  Outcome: Progressing Towards Goal     OCCUPATIONAL THERAPY RE-EVALUATION  Patient: Tigist Valentin (25 y.o. male)  Date: 1/10/2021  Diagnosis: AMS (altered mental status) [R41.82]  Fever [R50.9]  Encephalopathy acute [G93.40] <principal problem not specified>       Precautions:  Falls  Chart, occupational therapy assessment, plan of care, and goals were reviewed. ASSESSMENT  Pt is 67 y/o male [facility] home with AMS, fever, more confused than baseline mentation and adm 12/28/2020 for fever, AMS, acute encephalopathy, sepsis, aspiration pneumonia, hypokalemia. Pt has hx of CAD, alzheimers dementia. Per chart review, pt is LTC resident at [facility] with PLOF unknown at this time. CM note stating spouse unable to take care of pt requesting pt return to LTC. Pt received semi supine in posey bed agreeable for OT RA and oriented to self only. Patient pleasant and cooperative requiring cueing to initiate/continue task and to maintain focus on specific task 2/2 hx of dementia.  Pt currently presents with decreased balance (good static/dynamic sitting balance, fair static/dynamic standing with RW), decreased activity tolerance, generalized weakness and increased need for assist with self car (SBA LB dressing EOB, CGA UB dressing EOB adin gown, SBA UB bathing EOB with cues, SBA LB bathing EOB with cueing) and functional transfers/mobility (min A sup<->sit, mod A sit<->Stand and min/mod A toilet transfer simulated with Rw and gait belt). Pt educated on and completed laisha UE HEP for 1 set of 10 reps with SBA and verbal cueing in prep for self care tasks. Pt would benefit from skilled OT services while at [facility] in order to increase safety and independence with self care and functional transfers/mobility. D.C recommendation TBD between SNF vs LTC pending PLOF information. Other factors to consider for discharge: time since onset, unknown PLOF         PLAN :  Recommendations and Planned Interventions: self care training, functional mobility training, therapeutic exercise, balance training, therapeutic activities, endurance activities, patient education, and home safety training    Frequency/Duration: Patient will be followed by occupational therapy 3 times a week to address goals. Recommend with staff: BSC transfers    Recommend next OT session: toilet transfer and bathing and sink    Recommendation for discharge: (in order for the patient to meet his/her long term goals)  SNF vs LTC pending progress with therapy    This discharge recommendation:  Has been made in collaboration with the attending provider and/or case management    Equipment recommendations for successful discharge (if) home: TBD       SUBJECTIVE:   Patient stated my god it feels good to be clean.     OBJECTIVE DATA SUMMARY:   Hospital course since last seen and reason for reevaluation: LOS    Cognitive/Behavioral Status:  Neurologic State: Alert;Confused     Hearing: Auditory  Auditory Impairment: None    Range of Motion:  AROM: Generally decreased, functional     Strength:  Strength: Generally decreased, functional(grossly observed to be 3+/5)     Functional Mobility and Transfers for ADLs:  Bed Mobility:  Rolling: Minimum assistance  Supine to Sit: Minimum assistance  Sit to Supine: Minimum assistance  Scooting: Minimum assistance    Transfers:  Sit to Stand:  Moderate assistance  Functional Transfers  Bathroom Mobility: Minimum assistance; Moderate assistance  Toilet Transfer : Minimum assistance; Moderate assistance  Bed to Chair: Minimum assistance; Moderate assistance    Balance:  Sitting: Intact  Sitting - Static: Good (unsupported)  Sitting - Dynamic: Good (unsupported)  Standing: Impaired;Pull to stand; With support  Standing - Static: Fair  Standing - Dynamic : Fair    ADL Assessment:     Oral Facial Hygiene/Grooming: Stand-by assistance    Bathing: Stand-by assistance    Upper Body Dressing: Contact guard assistance    Lower Body Dressing: Stand-by assistance    ADL Intervention and task modifications:     Grooming  Grooming Assistance: Stand-by assistance  Position Performed: Seated edge of bed  Washing Face: Stand-by assistance  Washing Hands: Stand-by assistance    Upper Body Bathing  Bathing Assistance: Stand-by assistance  Position Performed: Seated edge of bed    Lower Body Bathing  Lower Body : Stand-by assistance  Position Performed: Seated edge of bed    Upper Body [address] Gown: Contact guard assistance    Lower Body Dressing Assistance  Socks: Stand-by assistance  Position Performed: Seated edge of bed    Therapeutic Exercises:   SBA laisha UE HEP in prep for self care tasks for 1 set of 10 reps in prep for self care tasks    Pain:  No pain reported    Activity Tolerance:   Good and requires rest breaks  Please refer to the flowsheet for vital signs taken during this treatment. After treatment patient left in no apparent distress:   Supine in bed and posey bed closed    COMMUNICATION/COLLABORATION:   The patients plan of care was discussed with: Registered nurseCaitie Richards  Time Calculation: 33 mins

## 2021-01-10 NOTE — PROGRESS NOTES
Progress Note    Patient: Judit Goldberg MRN: [id number]  SSN: [id number]    YOB: 1940  Age: 68 y.o. Sex: male      Admit Date: 12/28/2020    LOS: 12 days     Subjective:     Patient is confused     Objective:     Vitals:    01/09/21 1923 01/09/21 1932 01/10/21 0008 01/10/21 0929   BP: (!) 148/82 (!) 136/58 (!) 142/68 (!) 144/79   Pulse: 99 61 72 90   Resp: 18 16 18 18   Temp: 98.3 °F (36.8 °C) 98.3 °F (36.8 °C) 98.7 °F (37.1 °C) 98.7 °F (37.1 °C)   SpO2: 97%   99%   Weight:       Height:            Intake and Output:  Current Shift: No intake/output data recorded. Last three shifts: 01/08 1901 - 01/10 0700  In: 800 [P.O.:800]  Out: -     Physical Exam:   General:   Patient is drowsy   Eyes:  Conjunctivae/corneas clear. PERRL, EOMs intact. Fundi benign   Ears:  Normal TMs and external ear canals both ears. Nose: Nares normal. Septum midline. Mucosa normal. No drainage or sinus tenderness. Mouth/Throat: Lips, mucosa, and tongue normal. Teeth and gums normal.   Neck: Supple, symmetrical, trachea midline, no adenopathy, thyroid: no enlargment/tenderness/nodules, no carotid bruit and no JVD. Back:   Symmetric, no curvature. ROM normal. No CVA tenderness. Lungs:   Clear to auscultation bilaterally. Heart:  Regular rate and rhythm, S1, S2 normal, no murmur, click, rub or gallop. Abdomen:   Soft, non-tender. Bowel sounds normal. No masses,  No organomegaly. Extremities: Extremities normal, atraumatic, no cyanosis or edema. Pulses: 2+ and symmetric all extremities. Skin: Skin color, texture, turgor normal. No rashes or lesions   Lymph nodes: Cervical, supraclavicular, and axillary nodes normal.   Neurologic:  Patient is drowsy.      Recent Results (from the past 24 hour(s))   GLUCOSE, POC    Collection Time: 01/09/21  4:39 PM   Result Value Ref Range    Glucose (POC) 95 65 - 100 mg/dL    Performed by Naheed Hayes, POC    Collection Time: 01/09/21  7:25 PM   Result Value Ref Range    Glucose (POC) 136 (H) 65 - 100 mg/dL    Performed by Glow, BASIC    Collection Time: 01/09/21  9:32 PM   Result Value Ref Range    Sodium 144 136 - 145 mmol/L    Potassium 4.1 3.5 - 5.1 mmol/L    Chloride 112 (H) 97 - 108 mmol/L    CO2 27 21 - 32 mmol/L    Anion gap 5 5 - 15 mmol/L    Glucose 93 65 - 100 mg/dL    BUN 49 (H) 6 - 20 mg/dL    Creatinine 1.61 (H) 0.70 - 1.30 mg/dL    BUN/Creatinine ratio 30 (H) 12 - 20      GFR est AA 51 (L) >60 ml/min/1.73m2    GFR est non-AA 42 (L) >60 ml/min/1.73m2    Calcium 9.2 8.5 - 33.0 mg/dL   METABOLIC PANEL, BASIC    Collection Time: 01/10/21 10:30 AM   Result Value Ref Range    Sodium 146 (H) 136 - 145 mmol/L    Potassium 3.5 3.5 - 5.1 mmol/L    Chloride 113 (H) 97 - 108 mmol/L    CO2 27 21 - 32 mmol/L    Anion gap 6 5 - 15 mmol/L    Glucose 126 (H) 65 - 100 mg/dL    BUN 48 (H) 6 - 20 mg/dL    Creatinine 1.61 (H) 0.70 - 1.30 mg/dL    BUN/Creatinine ratio 30 (H) 12 - 20      GFR est AA 51 (L) >60 ml/min/1.73m2    GFR est non-AA 42 (L) >60 ml/min/1.73m2    Calcium 9.1 8.5 - 10.1 mg/dL         Lab/Data Review: All lab results for the last 24 hours reviewed.      Recent Results (from the past 24 hour(s))   GLUCOSE, POC    Collection Time: 01/09/21  4:39 PM   Result Value Ref Range    Glucose (POC) 95 65 - 100 mg/dL    Performed by Janet Lang    GLUCOSE, POC    Collection Time: 01/09/21  7:25 PM   Result Value Ref Range    Glucose (POC) 136 (H) 65 - 100 mg/dL    Performed by Glow, BASIC    Collection Time: 01/09/21  9:32 PM   Result Value Ref Range    Sodium 144 136 - 145 mmol/L    Potassium 4.1 3.5 - 5.1 mmol/L    Chloride 112 (H) 97 - 108 mmol/L    CO2 27 21 - 32 mmol/L    Anion gap 5 5 - 15 mmol/L    Glucose 93 65 - 100 mg/dL    BUN 49 (H) 6 - 20 mg/dL    Creatinine 1.61 (H) 0.70 - 1.30 mg/dL    BUN/Creatinine ratio 30 (H) 12 - 20      GFR est AA 51 (L) >60 ml/min/1.73m2 GFR est non-AA 42 (L) >60 ml/min/1.73m2    Calcium 9.2 8.5 - 19.4 mg/dL   METABOLIC PANEL, BASIC    Collection Time: 01/10/21 10:30 AM   Result Value Ref Range    Sodium 146 (H) 136 - 145 mmol/L    Potassium 3.5 3.5 - 5.1 mmol/L    Chloride 113 (H) 97 - 108 mmol/L    CO2 27 21 - 32 mmol/L    Anion gap 6 5 - 15 mmol/L    Glucose 126 (H) 65 - 100 mg/dL    BUN 48 (H) 6 - 20 mg/dL    Creatinine 1.61 (H) 0.70 - 1.30 mg/dL    BUN/Creatinine ratio 30 (H) 12 - 20      GFR est AA 51 (L) >60 ml/min/1.73m2    GFR est non-AA 42 (L) >60 ml/min/1.73m2    Calcium 9.1 8.5 - 10.1 mg/dL         Assessment:     Active Problems:    Fever (12/29/2020)      AMS (altered mental status) (12/29/2020)      Encephalopathy acute (12/29/2020)    Dementia end-stage with agitation     Plan:     Psychiatry evaluation pending    Signed By: Pat Gomez MD     January 10, 2021

## 2021-01-11 LAB
ANION GAP SERPL CALC-SCNC: 7 MMOL/L (ref 5–15)
BUN SERPL-MCNC: 44 MG/DL (ref 6–20)
BUN/CREAT SERPL: 29 (ref 12–20)
CA-I BLD-MCNC: 9.3 MG/DL (ref 8.5–10.1)
CHLORIDE SERPL-SCNC: 115 MMOL/L (ref 97–108)
CO2 SERPL-SCNC: 24 MMOL/L (ref 21–32)
CREAT SERPL-MCNC: 1.51 MG/DL (ref 0.7–1.3)
GLUCOSE SERPL-MCNC: 90 MG/DL (ref 65–100)
POTASSIUM SERPL-SCNC: 3.9 MMOL/L (ref 3.5–5.1)
SODIUM SERPL-SCNC: 146 MMOL/L (ref 136–145)

## 2021-01-11 PROCEDURE — 74011250637 HC RX REV CODE- 250/637: Performed by: INTERNAL MEDICINE

## 2021-01-11 PROCEDURE — 36415 COLL VENOUS BLD VENIPUNCTURE: CPT

## 2021-01-11 PROCEDURE — 74011250637 HC RX REV CODE- 250/637: Performed by: PSYCHIATRY & NEUROLOGY

## 2021-01-11 PROCEDURE — 74011250636 HC RX REV CODE- 250/636: Performed by: INTERNAL MEDICINE

## 2021-01-11 PROCEDURE — 65270000029 HC RM PRIVATE

## 2021-01-11 PROCEDURE — 80048 BASIC METABOLIC PNL TOTAL CA: CPT

## 2021-01-11 RX ORDER — OLANZAPINE 2.5 MG/1
2.5 TABLET ORAL 2 TIMES DAILY
Status: DISCONTINUED | OUTPATIENT
Start: 2021-01-11 | End: 2021-01-18

## 2021-01-11 RX ADMIN — AMLODIPINE BESYLATE 5 MG: 5 TABLET ORAL at 08:31

## 2021-01-11 RX ADMIN — HYDROXYZINE PAMOATE 25 MG: 25 CAPSULE ORAL at 02:30

## 2021-01-11 RX ADMIN — AMOXICILLIN AND CLAVULANATE POTASSIUM 1 TABLET: 500; 125 TABLET, FILM COATED ORAL at 21:00

## 2021-01-11 RX ADMIN — OLANZAPINE 2.5 MG: 2.5 TABLET, FILM COATED ORAL at 21:26

## 2021-01-11 RX ADMIN — AMOXICILLIN AND CLAVULANATE POTASSIUM 1 TABLET: 500; 125 TABLET, FILM COATED ORAL at 08:31

## 2021-01-11 RX ADMIN — APIXABAN 2.5 MG: 2.5 TABLET, FILM COATED ORAL at 08:31

## 2021-01-11 RX ADMIN — OLANZAPINE 2.5 MG: 2.5 TABLET, FILM COATED ORAL at 08:31

## 2021-01-11 RX ADMIN — METOPROLOL SUCCINATE 25 MG: 25 TABLET, EXTENDED RELEASE ORAL at 08:31

## 2021-01-11 RX ADMIN — APIXABAN 2.5 MG: 2.5 TABLET, FILM COATED ORAL at 21:26

## 2021-01-11 RX ADMIN — HALOPERIDOL LACTATE 1 MG: 5 INJECTION, SOLUTION INTRAMUSCULAR at 06:08

## 2021-01-11 NOTE — PROGRESS NOTES
Progress Note    Patient: Judit Goldberg MRN: [id number]  SSN: [id number]    YOB: 1940  Age: 68 y.o. Sex: male      Admit Date: 12/28/2020    LOS: 13 days     Subjective:     Patient is confused . Naked . Placement pending    Objective:     Vitals:    01/10/21 1950 01/11/21 0023 01/11/21 0830 01/11/21 1454   BP: (!) 147/80 (!) 156/75 138/76 (!) 146/70   Pulse: 74 83 98 86   Resp: 17 17 17 16   Temp: 98.1 °F (36.7 °C) 98.7 °F (37.1 °C) 98.3 °F (36.8 °C) 98 °F (36.7 °C)   SpO2: 99% 98% 97% 98%   Weight:       Height:            Intake and Output:  Current Shift: No intake/output data recorded. Last three shifts: 01/09 1901 - 01/11 0700  In: 2000 [P.O.:2000]  Out: -     Physical Exam:   General:   Patient is drowsy   Eyes:  Conjunctivae/corneas clear. PERRL, EOMs intact. Fundi benign   Ears:  Normal TMs and external ear canals both ears. Nose: Nares normal. Septum midline. Mucosa normal. No drainage or sinus tenderness. Mouth/Throat: Lips, mucosa, and tongue normal. Teeth and gums normal.   Neck: Supple, symmetrical, trachea midline, no adenopathy, thyroid: no enlargment/tenderness/nodules, no carotid bruit and no JVD. Back:   Symmetric, no curvature. ROM normal. No CVA tenderness. Lungs:   Clear to auscultation bilaterally. Heart:  Regular rate and rhythm, S1, S2 normal, no murmur, click, rub or gallop. Abdomen:   Soft, non-tender. Bowel sounds normal. No masses,  No organomegaly. Extremities: Extremities normal, atraumatic, no cyanosis or edema. Pulses: 2+ and symmetric all extremities. Skin: Skin color, texture, turgor normal. No rashes or lesions   Lymph nodes: Cervical, supraclavicular, and axillary nodes normal.   Neurologic:  Patient is drowsy.      Recent Results (from the past 24 hour(s))   METABOLIC PANEL, BASIC    Collection Time: 01/11/21  8:40 AM   Result Value Ref Range    Sodium 146 (H) 136 - 145 mmol/L    Potassium 3.9 3.5 - 5.1 mmol/L    Chloride 115 (H) 97 - 108 mmol/L    CO2 24 21 - 32 mmol/L    Anion gap 7 5 - 15 mmol/L    Glucose 90 65 - 100 mg/dL    BUN 44 (H) 6 - 20 mg/dL    Creatinine 1.51 (H) 0.70 - 1.30 mg/dL    BUN/Creatinine ratio 29 (H) 12 - 20      GFR est AA 54 (L) >60 ml/min/1.73m2    GFR est non-AA 45 (L) >60 ml/min/1.73m2    Calcium 9.3 8.5 - 10.1 mg/dL         Lab/Data Review: All lab results for the last 24 hours reviewed.      Recent Results (from the past 24 hour(s))   METABOLIC PANEL, BASIC    Collection Time: 01/11/21  8:40 AM   Result Value Ref Range    Sodium 146 (H) 136 - 145 mmol/L    Potassium 3.9 3.5 - 5.1 mmol/L    Chloride 115 (H) 97 - 108 mmol/L    CO2 24 21 - 32 mmol/L    Anion gap 7 5 - 15 mmol/L    Glucose 90 65 - 100 mg/dL    BUN 44 (H) 6 - 20 mg/dL    Creatinine 1.51 (H) 0.70 - 1.30 mg/dL    BUN/Creatinine ratio 29 (H) 12 - 20      GFR est AA 54 (L) >60 ml/min/1.73m2    GFR est non-AA 45 (L) >60 ml/min/1.73m2    Calcium 9.3 8.5 - 10.1 mg/dL     Current Facility-Administered Medications   Medication Dose Route Frequency Provider Last Rate Last Admin    hydrOXYzine pamoate (VISTARIL) capsule 25 mg  25 mg Oral TID PRN Deni REDDY MD   25 mg at 01/11/21 0230    OLANZapine (ZyPREXA) tablet 2.5 mg  2.5 mg Oral DAILY Jeffrey Land MD   2.5 mg at 01/11/21 0831    apixaban (ELIQUIS) tablet 2.5 mg  2.5 mg Oral BID Hubert Burt MD   2.5 mg at 01/11/21 0831    amoxicillin-clavulanate (AUGMENTIN) 500-125 mg per tablet 1 Tab  1 Tab Oral Q12H Sheba SUTHERLAND MD   1 Tab at 01/11/21 0831    metoprolol succinate (TOPROL-XL) XL tablet 25 mg  25 mg Oral DAILY Hubert Burt MD   25 mg at 01/11/21 0831    amLODIPine (NORVASC) tablet 5 mg  5 mg Oral DAILY Hubert Burt MD   5 mg at 01/11/21 0831    hydrALAZINE (APRESOLINE) tablet 25 mg  25 mg Oral TID PRN Misa Coppola NP   25 mg at 01/09/21 0424    haloperidol lactate (HALDOL) injection 1 mg  1 mg IntraMUSCular TID PRN Sheba SUTHERLAND MD   1 mg at 01/11/21 0608    sodium chloride (NS) flush 5-40 mL  5-40 mL IntraVENous PRN Dave SUTHERLAND MD        bisacodyL (DULCOLAX) suppository 10 mg  10 mg Rectal DAILY PRN Ellyn Tapia MD        acetaminophen (TYLENOL) tablet 650 mg  650 mg Oral Q6H PRN Dave SUTHERLAND MD        Or    acetaminophen (TYLENOL) suppository 650 mg  650 mg Rectal Q6H PRN Ellyn Tapia MD        polyethylene glycol (MIRALAX) packet 17 g  17 g Oral DAILY PRN Ellyn Tapia MD        promethazine (PHENERGAN) tablet 12.5 mg  12.5 mg Oral Q6H PRN Dave SUTHERLAND MD        Or    ondansetron [facility]) injection 4 mg  4 mg IntraVENous Q6H PRN Dave SUTHERLAND MD             Assessment:     Active Problems:    Fever (12/29/2020)      AMS (altered mental status) (12/29/2020)      Encephalopathy acute (12/29/2020)    Dementia end-stage with agitation   Increase zyprexa    Plan:     Pending placement    Signed By: Jefm Alpers, MD     January 11, 2021

## 2021-01-11 NOTE — PROGRESS NOTES
Chart reviewed. No accepting facility at this time. Additional referrals sent. CM attempted to meet with the patient at the bedside and he remains in a posey bed naked. CM will continue to seek placement, however, the posey bed remains a barrier. CM will also have Public Benefits reach out to patient's wife to determine if he qualifies for medicaid.

## 2021-01-12 PROCEDURE — 65270000029 HC RM PRIVATE

## 2021-01-12 PROCEDURE — 74011250637 HC RX REV CODE- 250/637: Performed by: INTERNAL MEDICINE

## 2021-01-12 RX ADMIN — AMLODIPINE BESYLATE 5 MG: 5 TABLET ORAL at 09:21

## 2021-01-12 RX ADMIN — AMOXICILLIN AND CLAVULANATE POTASSIUM 1 TABLET: 500; 125 TABLET, FILM COATED ORAL at 09:20

## 2021-01-12 RX ADMIN — OLANZAPINE 2.5 MG: 2.5 TABLET, FILM COATED ORAL at 20:40

## 2021-01-12 RX ADMIN — APIXABAN 2.5 MG: 2.5 TABLET, FILM COATED ORAL at 20:40

## 2021-01-12 RX ADMIN — OLANZAPINE 2.5 MG: 2.5 TABLET, FILM COATED ORAL at 09:20

## 2021-01-12 RX ADMIN — APIXABAN 2.5 MG: 2.5 TABLET, FILM COATED ORAL at 09:20

## 2021-01-12 RX ADMIN — AMOXICILLIN AND CLAVULANATE POTASSIUM 1 TABLET: 500; 125 TABLET, FILM COATED ORAL at 20:40

## 2021-01-12 RX ADMIN — METOPROLOL SUCCINATE 25 MG: 25 TABLET, EXTENDED RELEASE ORAL at 09:20

## 2021-01-12 NOTE — PROGRESS NOTES
CM reached out to the patient's wife regarding discharge plans and we discussed difficulty finding him placement. She relayed that she has some funds to privately pay for a group home but not a lot. She is very concerned as to why he cannot get placement closer to their residence in [place] [address]. CM explained barriers at this time. Mrs. Maycol Thomson stated she is willing to have CM reach out to group homes to private pay or memory care units within her budget. Juan Parada with \"[facility] group home at [phone]. She is interested in considering this patient and works with patient's who have Dementia. Provided her with Mrs. Martel's phone number and she will reach out. CM will continue to follow.

## 2021-01-12 NOTE — PROGRESS NOTES
Progress Note    Patient: Pavel Baeza MRN: [id number]  SSN: [id number]    YOB: 1940  Age: 68 y.o. Sex: male      Admit Date: 12/28/2020    LOS: 14 days     Subjective:     Patient is confused . Naked . Placement pending    Objective:     Vitals:    01/11/21 2031 01/11/21 2207 01/11/21 2319 01/12/21 0919   BP: (!) 142/82  135/69 (!) 146/87   Pulse: 98  86 78   Resp: 18  17 16   Temp: 98.7 °F (37.1 °C)  97.4 °F (36.3 °C) 99.1 °F (37.3 °C)   SpO2: 98% 98% 98% 100%   Weight:       Height:            Intake and Output:  Current Shift: No intake/output data recorded. Last three shifts: 01/10 1901 - 01/12 0700  In: 1200 [P.O.:1200]  Out: -     Physical Exam:   General:   Patient is drowsy   Eyes:  Conjunctivae/corneas clear. PERRL, EOMs intact. Fundi benign   Ears:  Normal TMs and external ear canals both ears. Nose: Nares normal. Septum midline. Mucosa normal. No drainage or sinus tenderness. Mouth/Throat: Lips, mucosa, and tongue normal. Teeth and gums normal.   Neck: Supple, symmetrical, trachea midline, no adenopathy, thyroid: no enlargment/tenderness/nodules, no carotid bruit and no JVD. Back:   Symmetric, no curvature. ROM normal. No CVA tenderness. Lungs:   Clear to auscultation bilaterally. Heart:  Regular rate and rhythm, S1, S2 normal, no murmur, click, rub or gallop. Abdomen:   Soft, non-tender. Bowel sounds normal. No masses,  No organomegaly. Extremities: Extremities normal, atraumatic, no cyanosis or edema. Pulses: 2+ and symmetric all extremities. Skin: Skin color, texture, turgor normal. No rashes or lesions   Lymph nodes: Cervical, supraclavicular, and axillary nodes normal.   Neurologic:  Patient is drowsy. No results found for this or any previous visit (from the past 24 hour(s)). Lab/Data Review: All lab results for the last 24 hours reviewed. No results found for this or any previous visit (from the past 24 hour(s)).   Current Facility-Administered Medications   Medication Dose Route Frequency Provider Last Rate Last Admin    OLANZapine (ZyPREXA) tablet 2.5 mg  2.5 mg Oral BID Sandhya SUTHERLAND MD   2.5 mg at 01/12/21 0920    hydrOXYzine pamoate (VISTARIL) capsule 25 mg  25 mg Oral TID PRN Jorge REDDY MD   25 mg at 01/11/21 0230    apixaban (ELIQUIS) tablet 2.5 mg  2.5 mg Oral BID Duncan Bo MD   2.5 mg at 01/12/21 0920    amoxicillin-clavulanate (AUGMENTIN) 500-125 mg per tablet 1 Tab  1 Tab Oral Q12H Christi Byrne MD   1 Tab at 01/12/21 0920    metoprolol succinate (TOPROL-XL) XL tablet 25 mg  25 mg Oral DAILY Duncan Bo MD   25 mg at 01/12/21 0920    amLODIPine (NORVASC) tablet 5 mg  5 mg Oral DAILY Duncan Bo MD   5 mg at 01/12/21 5980    hydrALAZINE (APRESOLINE) tablet 25 mg  25 mg Oral TID PRN Teto David NP   25 mg at 01/09/21 0424    haloperidol lactate (HALDOL) injection 1 mg  1 mg IntraMUSCular TID PRN Sandhya SUTHERLAND MD   1 mg at 01/11/21 0608    sodium chloride (NS) flush 5-40 mL  5-40 mL IntraVENous PRN Christi Byrne MD        bisacodyL (DULCOLAX) suppository 10 mg  10 mg Rectal DAILY PRN Christi Byrne MD        acetaminophen (TYLENOL) tablet 650 mg  650 mg Oral Q6H PRN Sandhya SUTHERLAND MD        Or   [facility] acetaminophen (TYLENOL) suppository 650 mg  650 mg Rectal Q6H PRN Christi Byrne MD        polyethylene glycol (MIRALAX) packet 17 g  17 g Oral DAILY PRN Christi Byrne MD        promethazine (PHENERGAN) tablet 12.5 mg  12.5 mg Oral Q6H PRN Sandhya SUTHERLAND MD        Or    ondansetron [facility]) injection 4 mg  4 mg IntraVENous Q6H PRN Christi Byrne MD             Assessment:     Active Problems:    Fever (12/29/2020)      AMS (altered mental status) (12/29/2020)      Encephalopathy acute (12/29/2020)    Dementia end-stage with agitation   Increase zyprexa    Plan:     Pending placement    Signed By: Jovita Dykes MD     January 12, 2021

## 2021-01-12 NOTE — PROGRESS NOTES
Problem: Falls - Risk of  Goal: *Absence of Falls  Description: Document Christina Nair Fall Risk and appropriate interventions in the flowsheet. Outcome: Progressing Towards Goal  Note: Fall Risk Interventions:  Mobility Interventions: Bed/chair exit alarm, OT consult for ADLs, PT Consult for assist device competence, Utilize walker, cane, or other assistive device    Mentation Interventions: Bed/chair exit alarm, More frequent rounding, Reorient patient, Update white board, Toileting rounds    Medication Interventions: Bed/chair exit alarm, Utilize gait belt for transfers/ambulation    Elimination Interventions: Bed/chair exit alarm, Call light in reach, Toileting schedule/hourly rounds    History of Falls Interventions: Bed/chair exit alarm         Problem: Pressure Injury - Risk of  Goal: *Prevention of pressure injury  Description: Document Carlyle Scale and appropriate interventions in the flowsheet.   Outcome: Progressing Towards Goal  Note: Pressure Injury Interventions:  Sensory Interventions: Discuss PT/OT consult with provider, Keep linens dry and wrinkle-free, Maintain/enhance activity level, Minimize linen layers, Monitor skin under medical devices    Moisture Interventions: Absorbent underpads, Apply protective barrier, creams and emollients, Limit adult briefs, Minimize layers, Moisture barrier    Activity Interventions: PT/OT evaluation    Mobility Interventions: PT/OT evaluation    Nutrition Interventions: Offer support with meals,snacks and hydration    Friction and Shear Interventions: Apply protective barrier, creams and emollients, Transferring/repositioning devices

## 2021-01-13 LAB
GLUCOSE BLD STRIP.AUTO-MCNC: 119 MG/DL (ref 65–100)
PERFORMED BY, TECHID: ABNORMAL

## 2021-01-13 PROCEDURE — 82962 GLUCOSE BLOOD TEST: CPT

## 2021-01-13 PROCEDURE — 74011250637 HC RX REV CODE- 250/637: Performed by: INTERNAL MEDICINE

## 2021-01-13 PROCEDURE — 97530 THERAPEUTIC ACTIVITIES: CPT

## 2021-01-13 PROCEDURE — 65270000029 HC RM PRIVATE

## 2021-01-13 RX ADMIN — AMLODIPINE BESYLATE 5 MG: 5 TABLET ORAL at 10:52

## 2021-01-13 RX ADMIN — OLANZAPINE 2.5 MG: 2.5 TABLET, FILM COATED ORAL at 10:52

## 2021-01-13 RX ADMIN — AMOXICILLIN AND CLAVULANATE POTASSIUM 1 TABLET: 500; 125 TABLET, FILM COATED ORAL at 10:52

## 2021-01-13 RX ADMIN — APIXABAN 2.5 MG: 2.5 TABLET, FILM COATED ORAL at 10:52

## 2021-01-13 RX ADMIN — APIXABAN 2.5 MG: 2.5 TABLET, FILM COATED ORAL at 20:33

## 2021-01-13 RX ADMIN — METOPROLOL SUCCINATE 25 MG: 25 TABLET, EXTENDED RELEASE ORAL at 10:52

## 2021-01-13 RX ADMIN — AMOXICILLIN AND CLAVULANATE POTASSIUM 1 TABLET: 500; 125 TABLET, FILM COATED ORAL at 20:33

## 2021-01-13 RX ADMIN — OLANZAPINE 2.5 MG: 2.5 TABLET, FILM COATED ORAL at 20:32

## 2021-01-13 NOTE — PROGRESS NOTES
Problem: Non-Violent Restraints  Goal: *Removal from restraints as soon as assessed to be safe  Outcome: Progressing Towards Goal  Goal: *No harm/injury to patient while restraints in use  Outcome: Progressing Towards Goal  Goal: *Patient's dignity will be maintained  Outcome: Progressing Towards Goal  Goal: *Patient Specific Goal (EDIT GOAL, INSERT TEXT)  Outcome: Progressing Towards Goal  Goal: Non-violent Restaints:Standard Interventions  Outcome: Progressing Towards Goal  Goal: Non-violent Restraints:Patient Interventions  Outcome: Progressing Towards Goal  Goal: Patient/Family Education  Outcome: Progressing Towards Goal     Problem: Falls - Risk of  Goal: *Absence of Falls  Description: Document Alondra Fall Risk and appropriate interventions in the flowsheet. Outcome: Progressing Towards Goal  Note: Fall Risk Interventions:  Mobility Interventions: Bed/chair exit alarm, OT consult for ADLs, PT Consult for mobility concerns    Mentation Interventions: Adequate sleep, hydration, pain control, Bed/chair exit alarm, Room close to nurse's station    Medication Interventions: Bed/chair exit alarm    Elimination Interventions: Bed/chair exit alarm, Call light in reach, Toileting schedule/hourly rounds    History of Falls Interventions: Bed/chair exit alarm, Door open when patient unattended         Problem: Patient Education: Go to Patient Education Activity  Goal: Patient/Family Education  Outcome: Progressing Towards Goal     Problem: Pressure Injury - Risk of  Goal: *Prevention of pressure injury  Description: Document Carlyle Scale and appropriate interventions in the flowsheet. Outcome: Progressing Towards Goal  Note: Pressure Injury Interventions:  Sensory Interventions: Assess changes in LOC, Assess need for specialty bed, Turn and reposition approx.  every two hours (pillows and wedges if needed), Minimize linen layers, Maintain/enhance activity level    Moisture Interventions: Absorbent underpads, Apply protective barrier, creams and emollients, Minimize layers, Maintain skin hydration (lotion/cream)    Activity Interventions: PT/OT evaluation, Increase time out of bed    Mobility Interventions: Assess need for specialty bed, HOB 30 degrees or less, PT/OT evaluation, Turn and reposition approx.  every two hours(pillow and wedges)    Nutrition Interventions: Document food/fluid/supplement intake, Offer support with meals,snacks and hydration    Friction and Shear Interventions: Apply protective barrier, creams and emollients, HOB 30 degrees or less, Minimize layers                Problem: Patient Education: Go to Patient Education Activity  Goal: Patient/Family Education  Outcome: Progressing Towards Goal     Problem: Patient Education: Go to Patient Education Activity  Goal: Patient/Family Education  Outcome: Progressing Towards Goal     Problem: Patient Education: Go to Patient Education Activity  Goal: Patient/Family Education  Outcome: Progressing Towards Goal

## 2021-01-13 NOTE — PROGRESS NOTES
Progress Note    Patient: Letitia Bond MRN: [id number]  SSN: [id number]    YOB: 1940  Age: 68 y.o. Sex: male      Admit Date: 12/28/2020    LOS: 15 days     Subjective:     Patient is confused . Naked . Placement pending patient is in [facility] bed    Objective:     Vitals:    01/12/21 0919 01/12/21 1935 01/12/21 2322 01/13/21 0837   BP: (!) 146/87 (!) 150/81 132/71 (!) 140/81   Pulse: 78 88 77 81   Resp: 16 16 18 20   Temp: 99.1 °F (37.3 °C) 97.7 °F (36.5 °C) (!) 96.7 °F (35.9 °C) 97.6 °F (36.4 °C)   SpO2: 100% 97% 96% 96%   Weight:       Height:            Intake and Output:  Current Shift: No intake/output data recorded. Last three shifts: No intake/output data recorded. Physical Exam:   General:   Patient is drowsy   Eyes:  Conjunctivae/corneas clear. PERRL, EOMs intact. Fundi benign   Ears:  Normal TMs and external ear canals both ears. Nose: Nares normal. Septum midline. Mucosa normal. No drainage or sinus tenderness. Mouth/Throat: Lips, mucosa, and tongue normal. Teeth and gums normal.   Neck: Supple, symmetrical, trachea midline, no adenopathy, thyroid: no enlargment/tenderness/nodules, no carotid bruit and no JVD. Back:   Symmetric, no curvature. ROM normal. No CVA tenderness. Lungs:   Clear to auscultation bilaterally. Heart:  Regular rate and rhythm, S1, S2 normal, no murmur, click, rub or gallop. Abdomen:   Soft, non-tender. Bowel sounds normal. No masses,  No organomegaly. Extremities: Extremities normal, atraumatic, no cyanosis or edema. Pulses: 2+ and symmetric all extremities. Skin: Skin color, texture, turgor normal. No rashes or lesions   Lymph nodes: Cervical, supraclavicular, and axillary nodes normal.   Neurologic:  Patient is drowsy. No results found for this or any previous visit (from the past 24 hour(s)). Lab/Data Review: All lab results for the last 24 hours reviewed.      No results found for this or any previous visit (from the past 24 hour(s)).   Current Facility-Administered Medications   Medication Dose Route Frequency Provider Last Rate Last Admin    OLANZapine (ZyPREXA) tablet 2.5 mg  2.5 mg Oral BID Karina SUTHERLAND MD   2.5 mg at 01/13/21 1052    hydrOXYzine pamoate (VISTARIL) capsule 25 mg  25 mg Oral TID PRN Nic REDDY MD   25 mg at 01/11/21 0230    apixaban (ELIQUIS) tablet 2.5 mg  2.5 mg Oral BID Meme Blake MD   2.5 mg at 01/13/21 1052    amoxicillin-clavulanate (AUGMENTIN) 500-125 mg per tablet 1 Tab  1 Tab Oral Q12H Ondina Perea MD   1 Tab at 01/13/21 1052    metoprolol succinate (TOPROL-XL) XL tablet 25 mg  25 mg Oral DAILY Meme Blake MD   25 mg at 01/13/21 1052    amLODIPine (NORVASC) tablet 5 mg  5 mg Oral DAILY Meme Blake MD   5 mg at 01/13/21 1052    hydrALAZINE (APRESOLINE) tablet 25 mg  25 mg Oral TID PRN Letitia Romero NP   25 mg at 01/09/21 0424    haloperidol lactate (HALDOL) injection 1 mg  1 mg IntraMUSCular TID PRN Ondina Perea MD   1 mg at 01/11/21 0608    sodium chloride (NS) flush 5-40 mL  5-40 mL IntraVENous PRN Karina SUTHERLAND MD        bisacodyL (DULCOLAX) suppository 10 mg  10 mg Rectal DAILY PRN Ondina Perea MD        acetaminophen (TYLENOL) tablet 650 mg  650 mg Oral Q6H PRN Karina SUTHERLAND MD        Or   [facility] acetaminophen (TYLENOL) suppository 650 mg  650 mg Rectal Q6H PRN Karina SUTHERLAND MD        polyethylene glycol (MIRALAX) packet 17 g  17 g Oral DAILY PRN Ondina Perea MD        promethazine (PHENERGAN) tablet 12.5 mg  12.5 mg Oral Q6H PRN Karina SUTHERLAND MD        Or    ondansetron [facility]) injection 4 mg  4 mg IntraVENous Q6H PRN Ondina Perea MD             Assessment:     Active Problems:    Fever (12/29/2020)      AMS (altered mental status) (12/29/2020)      Encephalopathy acute (12/29/2020)    Dementia end-stage with agitation   Increase zyprexa  Psychosis  Plan:     Pending placement    Signed By: Sergio Franklin MD     January 13, 2021

## 2021-01-13 NOTE — PROGRESS NOTES
PHYSICAL THERAPY TREATMENT  Patient: Alex Wade (78 y.o. male)  Date: 1/13/2021  Diagnosis: AMS (altered mental status) [R41.82]  Fever [R50.9]  Encephalopathy acute [G93.40] <principal problem not specified>       Precautions:    Chart, physical therapy assessment, plan of care and goals were reviewed. ASSESSMENT  Patient continues with skilled PT services and is progressing towards goals. Pt. Semi supine in [facility] bed completely naked upon arrival. SN accompanied therapist and gave patient morning medicine. Pt oriented to self only and is pleasant. Able to stand at EOB to get donned adult diaper. Able to ambulate 400' with RW at [facility] holding diaper to ensure it doesn't fall off. Performed seated LE TE. Returned to [facility] bed. Recommend DC to SNF VS. LTC. .     Current Level of Function Impacting Discharge (mobility/balance): COGNITION    Other factors to consider for discharge: TBD         PLAN :  Patient continues to benefit from skilled intervention to address the above impairments. Continue treatment per established plan of care. to address goals. Recommendation for discharge: (in order for the patient to meet his/her long term goals)  To be determined: SNF VS. LTC    This discharge recommendation:  Has been made in collaboration with the attending provider and/or case management    IF patient discharges home will need the following DME: rolling walker       SUBJECTIVE:   Patient stated IM LOOKING FOR SOMETHING. Doe Bullroman    OBJECTIVE DATA SUMMARY:   Critical Behavior:  Neurologic State: Alert, Confused  Orientation Level: Oriented to person, Disoriented to place, Disoriented to situation, Disoriented to time  Cognition: Decreased attention/concentration, Decreased command following, Impaired decision making, Poor safety awareness  Safety/Judgement: Decreased awareness of environment, Fall prevention  Functional Mobility Training:  Bed Mobility:  Rolling: Stand-by assistance  Supine to Sit: Stand-by assistance  Sit to Supine: Stand-by assistance  Scooting: Stand-by assistance        Transfers:  Sit to Stand: Stand-by assistance  Stand to Sit: Stand-by assistance  Stand Pivot Transfers: Stand-by assistance                          Balance:  Sitting: Intact  Sitting - Static: Good (unsupported)  Sitting - Dynamic: Good (unsupported)  Standing: Intact  Standing - Static: Good;Constant support  Standing - Dynamic : Good;Constant support  Ambulation/Gait Training:  Distance (ft): 400 Feet (ft)  Assistive Device: Walker, rolling;Gait belt  Ambulation - Level of Assistance: Contact guard assistance     Gait Description (WDL): Exceptions to WDL  Gait Abnormalities: Steppage gait        Base of Support: Narrowed  Stance: Time  Speed/Priti: Slow     Swing Pattern: Left symmetrical;Right symmetrical                 Stairs:              Therapeutic Exercises:   10 X SEATED LE TE  Pain Ratin    Activity Tolerance:   Fair  Please refer to the flowsheet for vital signs taken during this treatment.    After treatment patient left in no apparent distress:   Supine in bed    COMMUNICATION/COLLABORATION:   The patient’s plan of care was discussed with: Physical therapy assistant.     Elton Nelson   Time Calculation: 19 mins

## 2021-01-13 NOTE — PROGRESS NOTES
Comprehensive Nutrition Assessment    Type and Reason for Visit: Reassess(Goal)    Nutrition Recommendations/Plan:     Continue NDD2/thin      Monitor need for additional supplementation      SLP eval as indicated    Obtain updated measured wt as pt out of [address] to document %meal and supplement intakes in I/Os    Nutrition Assessment:  Admitted for fever, AMS. Per chart review, NH staff reported pt more confused from baseline. Combative with staff, required restraints, now in [facility] 119 bed. Psych consulted. On COVID r/o precautions in preparation for discharge. Ordered Cardiac diet throughout admit. Intake documented 100% x2 meal periods on 1/8 and 1/12. Spoke to RN who reported pt eating well today, consumed 90% of lunch, requires some reminders to swallow d/t getting distracted with talking but denied defecits w/ c/s or need for SLP workup. Noted on initial visit, RN relayed intakes ~50%, concern for pt refusing to chew  adjusted to [place]. Consumed 95% of tray on f/u. RN denied SLP eval at the time. Stated c/s ability intact. Labs: Na 146, BUN 44, Cr 1.51, BG WNL. Meds: bisacodyl, haldol. Malnutrition Assessment:  Malnutrition Status:  Mild malnutrition    Context:  Acute illness     Findings of the 6 clinical characteristics of malnutrition:   Energy Intake:  1 - 75% or less of est energy req for 7 or more days(d/t agitation)  Weight Loss:  No significant weight loss     Body Fat Loss:  Unable to assess,     Muscle Mass Loss:  Unable to assess,    Fluid Accumulation:  No significant fluid accumulation,      Nutrition Related Findings:  NFPE not performed. Pt in [facility] 119 bed. Appears well nourished, however on RD visual assessment, pt does not appear obese, slightly overwt. Needs updated measured once out of [facility]. No n/v or c/d. BM documented 1/11. No edema.       Wounds:    (redness to elbow, hip, no open wounds)       Current Nutrition Therapies:  DIET DYSPHAGIA MECH ALTERED (NDD2)    Anthropometric Measures:  · Height:  5' 10\" (177.8 cm)  · Current Body Wt:  124.3 kg (274 lb 0.5 oz)   · Usual Body Wt:  (NAM)     · Ideal Body Wt:  166 lbs:  165.1 %   · BMI Category:  Obese class 2 (BMI 35.0-39. 9)     Wt Readings from Last 3 Encounters:   12/28/20 124.3 kg (274 lb)   12/05/20 124.3 kg (274 lb)   11/28/20 122.5 kg (270 lb)   All wts appear stated. Pt appears to weigh much less on visual assessment, will f/u for measured wt as out of [facility].     Nutrition Diagnosis:   · Biting/chewing (masticatory) difficulty related to cognitive or neurological impairment as evidenced by (RN rec'sanaz Silvestre [facility])    Nutrition Interventions:   Food and/or Nutrient Delivery: Continue current diet  Nutrition Education and Counseling: Education not indicated  Coordination of Nutrition Care: Continue to monitor while inpatient    Goals:  Meet >75% EENs x 5-7 days  Maintain skin integrity       Nutrition Monitoring and Evaluation:   Behavioral-Environmental Outcomes: None identified  Food/Nutrient Intake Outcomes: Food and nutrient intake  Physical Signs/Symptoms Outcomes: Chewing or swallowing, GI status, Skin    Discharge Planning:    No discharge needs at this time     Electronically signed by Bear Troy on 1/13/2021 at 3:50 PM    Contact: [phone]

## 2021-01-14 PROCEDURE — 74011250637 HC RX REV CODE- 250/637: Performed by: INTERNAL MEDICINE

## 2021-01-14 PROCEDURE — 65270000029 HC RM PRIVATE

## 2021-01-14 RX ADMIN — AMOXICILLIN AND CLAVULANATE POTASSIUM 1 TABLET: 500; 125 TABLET, FILM COATED ORAL at 20:54

## 2021-01-14 RX ADMIN — AMOXICILLIN AND CLAVULANATE POTASSIUM 1 TABLET: 500; 125 TABLET, FILM COATED ORAL at 08:30

## 2021-01-14 RX ADMIN — APIXABAN 2.5 MG: 2.5 TABLET, FILM COATED ORAL at 20:54

## 2021-01-14 RX ADMIN — OLANZAPINE 2.5 MG: 2.5 TABLET, FILM COATED ORAL at 20:54

## 2021-01-14 RX ADMIN — METOPROLOL SUCCINATE 25 MG: 25 TABLET, EXTENDED RELEASE ORAL at 08:30

## 2021-01-14 RX ADMIN — AMLODIPINE BESYLATE 5 MG: 5 TABLET ORAL at 08:30

## 2021-01-14 RX ADMIN — APIXABAN 2.5 MG: 2.5 TABLET, FILM COATED ORAL at 08:30

## 2021-01-14 RX ADMIN — OLANZAPINE 2.5 MG: 2.5 TABLET, FILM COATED ORAL at 08:30

## 2021-01-14 NOTE — PROGRESS NOTES
Progress Note    Patient: Judit Goldberg MRN: [id number]  SSN: [id number]    YOB: 1940  Age: 68 y.o. Sex: male      Admit Date: 12/28/2020    LOS: 16 days     Subjective:     Patient is confused . Sleepy    Objective:     Vitals:    01/13/21 2149 01/13/21 2250 01/14/21 0802 01/14/21 1000   BP: 138/75 (!) 140/80 109/74 (!) 142/80   Pulse: 88   84   Resp: 20 20 18 18   Temp: 98 °F (36.7 °C) 98 °F (36.7 °C) 97.6 °F (36.4 °C) 98.4 °F (36.9 °C)   SpO2: 96% 96% 97% 99%   Weight:       Height:            Intake and Output:  Current Shift: No intake/output data recorded. Last three shifts: No intake/output data recorded. Physical Exam:   General:   Patient is drowsy   Eyes:  Conjunctivae/corneas clear. PERRL, EOMs intact. Fundi benign   Ears:  Normal TMs and external ear canals both ears. Nose: Nares normal. Septum midline. Mucosa normal. No drainage or sinus tenderness. Mouth/Throat: Lips, mucosa, and tongue normal. Teeth and gums normal.   Neck: Supple, symmetrical, trachea midline, no adenopathy, thyroid: no enlargment/tenderness/nodules, no carotid bruit and no JVD. Back:   Symmetric, no curvature. ROM normal. No CVA tenderness. Lungs:   Clear to auscultation bilaterally. Heart:  Regular rate and rhythm, S1, S2 normal, no murmur, click, rub or gallop. Abdomen:   Soft, non-tender. Bowel sounds normal. No masses,  No organomegaly. Extremities: Extremities normal, atraumatic, no cyanosis or edema. Pulses: 2+ and symmetric all extremities. Skin: Skin color, texture, turgor normal. No rashes or lesions   Lymph nodes: Cervical, supraclavicular, and axillary nodes normal.   Neurologic:  Patient is drowsy. Recent Results (from the past 24 hour(s))   GLUCOSE, POC    Collection Time: 01/13/21  8:23 PM   Result Value Ref Range    Glucose (POC) 119 (H) 65 - 100 mg/dL    Performed by Jayjayjoy Pressley          Lab/Data Review: All lab results for the last 24 hours reviewed. Recent Results (from the past 24 hour(s))   GLUCOSE, POC    Collection Time: 01/13/21  8:23 PM   Result Value Ref Range    Glucose (POC) 119 (H) 65 - 100 mg/dL    Performed by Patel LANGSTON      Current Facility-Administered Medications   Medication Dose Route Frequency Provider Last Rate Last Admin    OLANZapine (ZyPREXA) tablet 2.5 mg  2.5 mg Oral BID Riddhi SUTHERLAND MD   2.5 mg at 01/14/21 0830    hydrOXYzine pamoate (VISTARIL) capsule 25 mg  25 mg Oral TID PRN Leon REDDY MD   25 mg at 01/11/21 0230    apixaban (ELIQUIS) tablet 2.5 mg  2.5 mg Oral BID Sue Chandler MD   2.5 mg at 01/14/21 0830    amoxicillin-clavulanate (AUGMENTIN) 500-125 mg per tablet 1 Tab  1 Tab Oral Q12H Yolanda Chan MD   1 Tab at 01/14/21 0830    metoprolol succinate (TOPROL-XL) XL tablet 25 mg  25 mg Oral DAILY Sue Chandler MD   25 mg at 01/14/21 0830    amLODIPine (NORVASC) tablet 5 mg  5 mg Oral DAILY Sue Chandler MD   5 mg at 01/14/21 0830    hydrALAZINE (APRESOLINE) tablet 25 mg  25 mg Oral TID PRN Sejal Shipman NP   25 mg at 01/09/21 0424    haloperidol lactate (HALDOL) injection 1 mg  1 mg IntraMUSCular TID PRN Yolanda Chan MD   1 mg at 01/11/21 0608    sodium chloride (NS) flush 5-40 mL  5-40 mL IntraVENous PRN Riddhi SUTHERLAND MD        bisacodyL (DULCOLAX) suppository 10 mg  10 mg Rectal DAILY PRN Yolanda Chan MD        acetaminophen (TYLENOL) tablet 650 mg  650 mg Oral Q6H PRN Riddhi SUTHERLAND MD        Or   [facility] acetaminophen (TYLENOL) suppository 650 mg  650 mg Rectal Q6H PRN Riddhi SUTHERLAND MD        polyethylene glycol (MIRALAX) packet 17 g  17 g Oral DAILY PRN Yolanda Chan MD        promethazine (PHENERGAN) tablet 12.5 mg  12.5 mg Oral Q6H PRN Riddhi SUTHERLAND MD        Or    ondansetron (ZOFRAN) injection 4 mg  4 mg IntraVENous Q6H PRN Yolanda Chan MD             Assessment:     Active Problems:    Fever (12/29/2020)      AMS (altered mental status) (12/29/2020)      Encephalopathy acute (12/29/2020)    Dementia end-stage with agitation   Increase zyprexa  Psychosis  Plan:     Pending placement    Signed By: Janet Barrios MD     January 14, 2021

## 2021-01-14 NOTE — PROGRESS NOTES
Problem: Non-Violent Restraints  Goal: *Removal from restraints as soon as assessed to be safe  Outcome: Progressing Towards Goal  Goal: *No harm/injury to patient while restraints in use  Outcome: Progressing Towards Goal  Goal: *Patient's dignity will be maintained  Outcome: Progressing Towards Goal  Goal: *Patient Specific Goal (EDIT GOAL, INSERT TEXT)  Outcome: Progressing Towards Goal  Goal: Non-violent Restaints:Standard Interventions  Outcome: Progressing Towards Goal  Goal: Non-violent Restraints:Patient Interventions  Outcome: Progressing Towards Goal  Goal: Patient/Family Education  Outcome: Progressing Towards Goal     Problem: Falls - Risk of  Goal: *Absence of Falls  Description: Document Alondra Fall Risk and appropriate interventions in the flowsheet. Outcome: Progressing Towards Goal  Note: Fall Risk Interventions:  Mobility Interventions: Bed/chair exit alarm, PT Consult for mobility concerns    Mentation Interventions: Adequate sleep, hydration, pain control, Bed/chair exit alarm, Door open when patient unattended, More frequent rounding, Room close to nurse's station    Medication Interventions: Bed/chair exit alarm    Elimination Interventions: Toileting schedule/hourly rounds    History of Falls Interventions: Bed/chair exit alarm, Door open when patient unattended         Problem: Patient Education: Go to Patient Education Activity  Goal: Patient/Family Education  Outcome: Progressing Towards Goal     Problem: Pressure Injury - Risk of  Goal: *Prevention of pressure injury  Description: Document Carlyle Scale and appropriate interventions in the flowsheet. Outcome: Progressing Towards Goal  Note: Pressure Injury Interventions:  Sensory Interventions: Assess changes in LOC, Assess need for specialty bed, Keep linens dry and wrinkle-free, Minimize linen layers, Turn and reposition approx.  every two hours (pillows and wedges if needed)    Moisture Interventions: Absorbent underpads, Apply protective barrier, creams and emollients, Minimize layers, Maintain skin hydration (lotion/cream)    Activity Interventions: PT/OT evaluation, Increase time out of bed    Mobility Interventions: HOB 30 degrees or less, PT/OT evaluation, Turn and reposition approx.  every two hours(pillow and wedges)    Nutrition Interventions: Document food/fluid/supplement intake, Offer support with meals,snacks and hydration    Friction and Shear Interventions: Apply protective barrier, creams and emollients, Minimize layers, HOB 30 degrees or less                Problem: Patient Education: Go to Patient Education Activity  Goal: Patient/Family Education  Outcome: Progressing Towards Goal     Problem: Patient Education: Go to Patient Education Activity  Goal: Patient/Family Education  Outcome: Progressing Towards Goal     Problem: Patient Education: Go to Patient Education Activity  Goal: Patient/Family Education  Outcome: Progressing Towards Goal

## 2021-01-14 NOTE — PROGRESS NOTES
Received call back from [employer]. Brooke Guzmán stated she spoke with the patient's wife and she is not open to the patient moving to [facility]. She lives in [place] and is requesting placement closer to her. CM sent mass referral to [facility]'s/Group Homes/Memory Care Unit's. Received call from [facility] with [facility] stating they have a bed at their [address] in [place]. CM provided her with patient's wife's phone number. She will call and discuss their facility and prices and call CM back. Anastasiia can be reached at [phone].

## 2021-01-14 NOTE — PROGRESS NOTES
Problem: Falls - Risk of  Goal: *Absence of Falls  Description: Document Cait Weber Fall Risk and appropriate interventions in the flowsheet. Outcome: Progressing Towards Goal  Note: Fall Risk Interventions:  Mobility Interventions: Bed/chair exit alarm, OT consult for ADLs, PT Consult for mobility concerns    Mentation Interventions: Adequate sleep, hydration, pain control, Bed/chair exit alarm, Room close to nurse's station    Medication Interventions: Bed/chair exit alarm    Elimination Interventions: Bed/chair exit alarm, Call light in reach, Toileting schedule/hourly rounds    History of Falls Interventions: Bed/chair exit alarm, Door open when patient unattended         Problem: Pressure Injury - Risk of  Goal: *Prevention of pressure injury  Description: Document Carlyle Scale and appropriate interventions in the flowsheet. Outcome: Progressing Towards Goal  Note: Pressure Injury Interventions:  Sensory Interventions: Assess changes in LOC, Assess need for specialty bed, Turn and reposition approx. every two hours (pillows and wedges if needed), Minimize linen layers, Maintain/enhance activity level    Moisture Interventions: Absorbent underpads, Apply protective barrier, creams and emollients, Minimize layers, Maintain skin hydration (lotion/cream)    Activity Interventions: PT/OT evaluation, Increase time out of bed    Mobility Interventions: Assess need for specialty bed, HOB 30 degrees or less, PT/OT evaluation, Turn and reposition approx.  every two hours(pillow and wedges)    Nutrition Interventions: Document food/fluid/supplement intake, Offer support with meals,snacks and hydration    Friction and Shear Interventions: Apply protective barrier, creams and emollients, HOB 30 degrees or less, Minimize layers

## 2021-01-14 NOTE — ROUTINE PROCESS
LOS skin assessment performed with Maycol Dick RN. Excoriation located in groin area, z-guard applied to area. Other than above noted, skin is warm, dry, and intact.

## 2021-01-15 LAB
GLUCOSE BLD STRIP.AUTO-MCNC: 120 MG/DL (ref 65–100)
PERFORMED BY, TECHID: ABNORMAL

## 2021-01-15 PROCEDURE — 74011250637 HC RX REV CODE- 250/637: Performed by: INTERNAL MEDICINE

## 2021-01-15 PROCEDURE — 65270000029 HC RM PRIVATE

## 2021-01-15 PROCEDURE — 82962 GLUCOSE BLOOD TEST: CPT

## 2021-01-15 PROCEDURE — 74011250637 HC RX REV CODE- 250/637: Performed by: PSYCHIATRY & NEUROLOGY

## 2021-01-15 RX ADMIN — AMOXICILLIN AND CLAVULANATE POTASSIUM 1 TABLET: 500; 125 TABLET, FILM COATED ORAL at 15:19

## 2021-01-15 RX ADMIN — APIXABAN 2.5 MG: 2.5 TABLET, FILM COATED ORAL at 21:10

## 2021-01-15 RX ADMIN — APIXABAN 2.5 MG: 2.5 TABLET, FILM COATED ORAL at 10:45

## 2021-01-15 RX ADMIN — HYDROXYZINE PAMOATE 25 MG: 25 CAPSULE ORAL at 10:45

## 2021-01-15 RX ADMIN — OLANZAPINE 2.5 MG: 2.5 TABLET, FILM COATED ORAL at 10:45

## 2021-01-15 RX ADMIN — AMOXICILLIN AND CLAVULANATE POTASSIUM 1 TABLET: 500; 125 TABLET, FILM COATED ORAL at 21:10

## 2021-01-15 RX ADMIN — OLANZAPINE 2.5 MG: 2.5 TABLET, FILM COATED ORAL at 21:10

## 2021-01-15 RX ADMIN — AMLODIPINE BESYLATE 5 MG: 5 TABLET ORAL at 10:45

## 2021-01-15 RX ADMIN — METOPROLOL SUCCINATE 25 MG: 25 TABLET, EXTENDED RELEASE ORAL at 10:45

## 2021-01-15 NOTE — PROGRESS NOTES
Problem: Falls - Risk of  Goal: *Absence of Falls  Description: Document Carmela Browning Fall Risk and appropriate interventions in the flowsheet. Outcome: Progressing Towards Goal  Note: Fall Risk Interventions:  Mobility Interventions: Bed/chair exit alarm, PT Consult for mobility concerns    Mentation Interventions: Adequate sleep, hydration, pain control, Bed/chair exit alarm, Door open when patient unattended, More frequent rounding, Room close to nurse's station    Medication Interventions: Bed/chair exit alarm    Elimination Interventions: Toileting schedule/hourly rounds    History of Falls Interventions: Bed/chair exit alarm, Door open when patient unattended         Problem: Pressure Injury - Risk of  Goal: *Prevention of pressure injury  Description: Document Carlyle Scale and appropriate interventions in the flowsheet. Outcome: Progressing Towards Goal  Note: Pressure Injury Interventions:  Sensory Interventions: Assess changes in LOC, Assess need for specialty bed, Keep linens dry and wrinkle-free, Minimize linen layers, Turn and reposition approx. every two hours (pillows and wedges if needed)    Moisture Interventions: Absorbent underpads, Apply protective barrier, creams and emollients, Minimize layers, Maintain skin hydration (lotion/cream)    Activity Interventions: PT/OT evaluation, Increase time out of bed    Mobility Interventions: HOB 30 degrees or less, PT/OT evaluation, Turn and reposition approx.  every two hours(pillow and wedges)    Nutrition Interventions: Document food/fluid/supplement intake, Offer support with meals,snacks and hydration    Friction and Shear Interventions: Apply protective barrier, creams and emollients, Minimize layers, HOB 30 degrees or less

## 2021-01-15 NOTE — PROGRESS NOTES
Bedside and Verbal shift change report given to [address] (oncoming nurse) by Venkata Cota RN (offgoing nurse). Report included the following information SBAR, Intake/Output, MAR and Recent Results.

## 2021-01-15 NOTE — PROGRESS NOTES
Problem: Non-Violent Restraints  Goal: *Removal from restraints as soon as assessed to be safe  Outcome: Progressing Towards Goal  Goal: *No harm/injury to patient while restraints in use  Outcome: Progressing Towards Goal  Goal: *Patient's dignity will be maintained  Outcome: Progressing Towards Goal  Goal: *Patient Specific Goal (EDIT GOAL, INSERT TEXT)  Outcome: Progressing Towards Goal  Goal: Non-violent Restaints:Standard Interventions  Outcome: Progressing Towards Goal  Goal: Non-violent Restraints:Patient Interventions  Outcome: Progressing Towards Goal  Goal: Patient/Family Education  Outcome: Progressing Towards Goal     Problem: Falls - Risk of  Goal: *Absence of Falls  Description: Document Alondra Fall Risk and appropriate interventions in the flowsheet. Outcome: Progressing Towards Goal  Note: Fall Risk Interventions:  Mobility Interventions: Bed/chair exit alarm    Mentation Interventions: Adequate sleep, hydration, pain control    Medication Interventions: Bed/chair exit alarm    Elimination Interventions:  Toileting schedule/hourly rounds    History of Falls Interventions: Bed/chair exit alarm

## 2021-01-15 NOTE — PROGRESS NOTES
Progress Note    Patient: Myron Taylor MRN: [id number]  SSN: [id number]    YOB: 1940  Age: 68 y.o. Sex: male      Admit Date: 12/28/2020    LOS: 17 days     Subjective:     Patient is confused . Sleepy    Objective:     Vitals:    01/14/21 1000 01/14/21 1951 01/15/21 0808 01/15/21 1513   BP: (!) 142/80 120/65 (!) 151/81 120/76   Pulse: 84 82 75 85   Resp: 18 17 18 18   Temp: 98.4 °F (36.9 °C) 98.4 °F (36.9 °C) 98.6 °F (37 °C) 97.8 °F (36.6 °C)   SpO2: 99% 97% 99%    Weight:       Height:            Intake and Output:  Current Shift: No intake/output data recorded. Last three shifts: No intake/output data recorded. Physical Exam:   General:   Patient is drowsy   Eyes:  Conjunctivae/corneas clear. PERRL, EOMs intact. Fundi benign   Ears:  Normal TMs and external ear canals both ears. Nose: Nares normal. Septum midline. Mucosa normal. No drainage or sinus tenderness. Mouth/Throat: Lips, mucosa, and tongue normal. Teeth and gums normal.   Neck: Supple, symmetrical, trachea midline, no adenopathy, thyroid: no enlargment/tenderness/nodules, no carotid bruit and no JVD. Back:   Symmetric, no curvature. ROM normal. No CVA tenderness. Lungs:   Clear to auscultation bilaterally. Heart:  Regular rate and rhythm, S1, S2 normal, no murmur, click, rub or gallop. Abdomen:   Soft, non-tender. Bowel sounds normal. No masses,  No organomegaly. Extremities: Extremities normal, atraumatic, no cyanosis or edema. Pulses: 2+ and symmetric all extremities. Skin: Skin color, texture, turgor normal. No rashes or lesions   Lymph nodes: Cervical, supraclavicular, and axillary nodes normal.   Neurologic:  Patient is drowsy. No results found for this or any previous visit (from the past 24 hour(s)). Lab/Data Review: All lab results for the last 24 hours reviewed. No results found for this or any previous visit (from the past 24 hour(s)).   Current Facility-Administered Medications Medication Dose Route Frequency Provider Last Rate Last Admin    OLANZapine (ZyPREXA) tablet 2.5 mg  2.5 mg Oral BID Ivory SUTHERLAND MD   2.5 mg at 01/15/21 1045    hydrOXYzine pamoate (VISTARIL) capsule 25 mg  25 mg Oral TID PRN Audra REDDY MD   25 mg at 01/15/21 1045    apixaban (ELIQUIS) tablet 2.5 mg  2.5 mg Oral BID Mercedes Haddad MD   2.5 mg at 01/15/21 1045    amoxicillin-clavulanate (AUGMENTIN) 500-125 mg per tablet 1 Tab  1 Tab Oral Q12H Yoana Sung MD   1 Tab at 01/15/21 1519    metoprolol succinate (TOPROL-XL) XL tablet 25 mg  25 mg Oral DAILY Mercedes Haddad MD   25 mg at 01/15/21 1045    amLODIPine (NORVASC) tablet 5 mg  5 mg Oral DAILY Mercedes Haddad MD   5 mg at 01/15/21 1045    hydrALAZINE (APRESOLINE) tablet 25 mg  25 mg Oral TID PRN Lori Morrison NP   25 mg at 01/09/21 0424    haloperidol lactate (HALDOL) injection 1 mg  1 mg IntraMUSCular TID PRN Ivory SUTHERLAND MD   1 mg at 01/11/21 0608    sodium chloride (NS) flush 5-40 mL  5-40 mL IntraVENous PRN Yoana Sung MD        bisacodyL (DULCOLAX) suppository 10 mg  10 mg Rectal DAILY PRN Yoana Sung MD        acetaminophen (TYLENOL) tablet 650 mg  650 mg Oral Q6H PRN Ivory SUTHERLAND MD        Or   [facility] acetaminophen (TYLENOL) suppository 650 mg  650 mg Rectal Q6H PRN Yoana Sung MD        polyethylene glycol (MIRALAX) packet 17 g  17 g Oral DAILY PRN Yoana Sung MD        promethazine (PHENERGAN) tablet 12.5 mg  12.5 mg Oral Q6H PRN Ivory SUTHERLAND MD        Or    ondansetron [facility]) injection 4 mg  4 mg IntraVENous Q6H PRN Yoana Sung MD             Assessment:     Active Problems:    Fever (12/29/2020)      AMS (altered mental status) (12/29/2020)      Encephalopathy acute (12/29/2020)    Dementia end-stage with agitation   Increase zyprexa  Psychosis  Plan:     Pending placement    Signed By: Virgie Salas MD     January 15, 2021

## 2021-01-16 LAB
GLUCOSE BLD STRIP.AUTO-MCNC: 121 MG/DL (ref 65–100)
PERFORMED BY, TECHID: ABNORMAL

## 2021-01-16 PROCEDURE — 74011250637 HC RX REV CODE- 250/637: Performed by: PSYCHIATRY & NEUROLOGY

## 2021-01-16 PROCEDURE — 82962 GLUCOSE BLOOD TEST: CPT

## 2021-01-16 PROCEDURE — 74011250637 HC RX REV CODE- 250/637: Performed by: INTERNAL MEDICINE

## 2021-01-16 PROCEDURE — 94760 N-INVAS EAR/PLS OXIMETRY 1: CPT

## 2021-01-16 PROCEDURE — 65270000029 HC RM PRIVATE

## 2021-01-16 RX ADMIN — OLANZAPINE 2.5 MG: 2.5 TABLET, FILM COATED ORAL at 11:12

## 2021-01-16 RX ADMIN — AMLODIPINE BESYLATE 5 MG: 5 TABLET ORAL at 11:12

## 2021-01-16 RX ADMIN — HYDROXYZINE PAMOATE 25 MG: 25 CAPSULE ORAL at 16:52

## 2021-01-16 RX ADMIN — METOPROLOL SUCCINATE 25 MG: 25 TABLET, EXTENDED RELEASE ORAL at 11:12

## 2021-01-16 RX ADMIN — APIXABAN 2.5 MG: 2.5 TABLET, FILM COATED ORAL at 11:11

## 2021-01-16 RX ADMIN — OLANZAPINE 2.5 MG: 2.5 TABLET, FILM COATED ORAL at 21:58

## 2021-01-16 RX ADMIN — APIXABAN 2.5 MG: 2.5 TABLET, FILM COATED ORAL at 21:59

## 2021-01-16 RX ADMIN — HYDROXYZINE PAMOATE 25 MG: 25 CAPSULE ORAL at 21:59

## 2021-01-16 NOTE — PROGRESS NOTES
Problem: Falls - Risk of  Goal: *Absence of Falls  Description: Document Thora Bare Fall Risk and appropriate interventions in the flowsheet. Outcome: Progressing Towards Goal  Note: Fall Risk Interventions:  Mobility Interventions: Bed/chair exit alarm    Mentation Interventions: Adequate sleep, hydration, pain control    Medication Interventions: Bed/chair exit alarm    Elimination Interventions: Toileting schedule/hourly rounds    History of Falls Interventions: Bed/chair exit alarm         Problem: Pressure Injury - Risk of  Goal: *Prevention of pressure injury  Description: Document Carlyle Scale and appropriate interventions in the flowsheet.   Outcome: Progressing Towards Goal  Note: Pressure Injury Interventions:  Sensory Interventions: Assess changes in LOC, Assess need for specialty bed    Moisture Interventions: Absorbent underpads, Apply protective barrier, creams and emollients    Activity Interventions: PT/OT evaluation    Mobility Interventions: PT/OT evaluation    Nutrition Interventions: Document food/fluid/supplement intake    Friction and Shear Interventions: Apply protective barrier, creams and emollients

## 2021-01-16 NOTE — PROGRESS NOTES
Progress Note  Date:2021       Room:[facility]  Patient Name:Trey Peralta     YOB: 1940     Age:80 y.o. Subjective    Subjective Patient sitting up in posey bed without clothes in posey bed events last night noted. Review of Systems   Unable to perform ROS: Dementia     Objective         Vitals Last 24 Hours:  TEMPERATURE:  Temp  Av.9 °F (36.6 °C)  Min: 97.7 °F (36.5 °C)  Max: 98 °F (36.7 °C)  RESPIRATIONS RANGE: Resp  Av.8  Min: 17  Max: 18  PULSE OXIMETRY RANGE: SpO2  Av.3 %  Min: 98 %  Max: 99 %  PULSE RANGE: Pulse  Av.8  Min: 69  Max: 89  BLOOD PRESSURE RANGE: Systolic (73QSE), TIN:422 , Min:116 , VNP:462   ; Diastolic (28HTT), KDU:23, Min:69, Max:77    I/O (24Hr): No intake or output data in the 24 hours ending 21 1220  Objective:  General Appearance:  Comfortable, in no acute distress and not in pain. Vital signs: (most recent): Blood pressure 128/76, pulse 69, temperature 97.7 °F (36.5 °C), resp. rate 17, height 5' 10\" (1.778 m), weight 124.3 kg (274 lb), SpO2 99 %. Vital signs are normal.    Output: Producing urine and producing stool. HEENT: Normal HEENT exam.    Lungs:  Normal effort and normal respiratory rate. Breath sounds clear to auscultation. Heart: Normal rate. Regular rhythm. S1 normal and S2 normal.    Abdomen: Abdomen is soft. Bowel sounds are normal.   There is no abdominal tenderness. Extremities: Normal range of motion. Neurological: Patient is alert. (To self). Skin:  Warm and dry. Labs/Imaging/Diagnostics    Labs:  CBC:No results for input(s): WBC, RBC, HGB, HCT, MCV, RDW, PLT, HGBEXT, HCTEXT, PLTEXT in the last 72 hours. CHEMISTRIES:No results for input(s): NA, K, CL, CO2, BUN, CA, PHOS, MG in the last 72 hours. No lab exists for component: CREATININE, GLUCOSEPT/INR:No results for input(s): INR, INREXT in the last 72 hours.     No lab exists for component: PROTIME  APTT:No results for input(s): APTT in the last 72 hours. LIVER PROFILE:No results for input(s): AST, ALT in the last 72 hours. No lab exists for component: Hattie Kenton, ALKPHOS  Lab Results   Component Value Date/Time    ALT (SGPT) 30 12/28/2020 11:00 PM    AST (SGOT) 42 (H) 12/28/2020 11:00 PM    Alk. phosphatase 50 12/28/2020 11:00 PM    Bilirubin, total 0.7 12/28/2020 11:00 PM       Imaging Last 24 Hours:  No results found. Assessment//Plan   Active Problems:    Fever (12/29/2020)      AMS (altered mental status) (12/29/2020)      Encephalopathy acute (12/29/2020)      Assessment:  (Alzheimer's Dementia with agitation has haldol prn and vistaril  Fever resolved  Cad  hypertension  ). Plan:   (Repeat labs in am   will discontinue Augmentin no source of infection  Continue current regimen  Case discussed with Dr. Lana Londono  ).        Electronically signed by Juliana Johnson NP on 1/16/2021 at 12:20 PM

## 2021-01-16 NOTE — PROGRESS NOTES
Behavioral note. I have been providing care for Mr. Courtney Will for 3 consecutive nights. Each night the patient has persistently removed his gown and pushed the posey bed mattress to the head of the bed, removing covers and sitting sideways at the foot of the bed. Patient has fallen asleep several times at the foot of the bed with back against netting of posey bed and feet on opposite side against netting. The patient is incontinent of bowel and bladder. Last night the patient had a bowel movement that he proceeded to smear across the mattress with his feet. After several attempts to put patients posey bed mattress and bedding back together, the mattress was removed. Since removing mattress the patient has been resting still.

## 2021-01-17 LAB
ANION GAP SERPL CALC-SCNC: 6 MMOL/L (ref 5–15)
BASOPHILS # BLD: 0 K/UL (ref 0–0.1)
BASOPHILS NFR BLD: 0 % (ref 0–1)
BUN SERPL-MCNC: 46 MG/DL (ref 6–20)
BUN/CREAT SERPL: 23 (ref 12–20)
CA-I BLD-MCNC: 9.1 MG/DL (ref 8.5–10.1)
CHLORIDE SERPL-SCNC: 113 MMOL/L (ref 97–108)
CO2 SERPL-SCNC: 29 MMOL/L (ref 21–32)
CREAT SERPL-MCNC: 2.01 MG/DL (ref 0.7–1.3)
DIFFERENTIAL METHOD BLD: NORMAL
EOSINOPHIL # BLD: 0.2 K/UL (ref 0–0.4)
EOSINOPHIL NFR BLD: 3 % (ref 0–7)
ERYTHROCYTE [DISTWIDTH] IN BLOOD BY AUTOMATED COUNT: 13.8 % (ref 11.5–14.5)
GLUCOSE BLD STRIP.AUTO-MCNC: 122 MG/DL (ref 65–100)
GLUCOSE BLD STRIP.AUTO-MCNC: 91 MG/DL (ref 65–100)
GLUCOSE SERPL-MCNC: 113 MG/DL (ref 65–100)
HCT VFR BLD AUTO: 42 % (ref 36.6–50.3)
HGB BLD-MCNC: 13.9 G/DL (ref 12.1–17)
IMM GRANULOCYTES # BLD AUTO: 0 K/UL (ref 0–0.04)
IMM GRANULOCYTES NFR BLD AUTO: 0 % (ref 0–0.5)
LYMPHOCYTES # BLD: 1.3 K/UL (ref 0.8–3.5)
LYMPHOCYTES NFR BLD: 19 % (ref 12–49)
MCH RBC QN AUTO: 30.5 PG (ref 26–34)
MCHC RBC AUTO-ENTMCNC: 33.1 G/DL (ref 30–36.5)
MCV RBC AUTO: 92.1 FL (ref 80–99)
MONOCYTES # BLD: 0.5 K/UL (ref 0–1)
MONOCYTES NFR BLD: 7 % (ref 5–13)
NEUTS SEG # BLD: 4.7 K/UL (ref 1.8–8)
NEUTS SEG NFR BLD: 71 % (ref 32–75)
PERFORMED BY, TECHID: ABNORMAL
PERFORMED BY, TECHID: NORMAL
PLATELET # BLD AUTO: 252 K/UL (ref 150–400)
PMV BLD AUTO: 10.4 FL (ref 8.9–12.9)
POTASSIUM SERPL-SCNC: 3.7 MMOL/L (ref 3.5–5.1)
RBC # BLD AUTO: 4.56 M/UL (ref 4.1–5.7)
SODIUM SERPL-SCNC: 148 MMOL/L (ref 136–145)
WBC # BLD AUTO: 6.7 K/UL (ref 4.1–11.1)

## 2021-01-17 PROCEDURE — 85025 COMPLETE CBC W/AUTO DIFF WBC: CPT

## 2021-01-17 PROCEDURE — 74011250637 HC RX REV CODE- 250/637: Performed by: INTERNAL MEDICINE

## 2021-01-17 PROCEDURE — 74011250637 HC RX REV CODE- 250/637: Performed by: PSYCHIATRY & NEUROLOGY

## 2021-01-17 PROCEDURE — 36415 COLL VENOUS BLD VENIPUNCTURE: CPT

## 2021-01-17 PROCEDURE — 80048 BASIC METABOLIC PNL TOTAL CA: CPT

## 2021-01-17 PROCEDURE — 65270000029 HC RM PRIVATE

## 2021-01-17 PROCEDURE — 82962 GLUCOSE BLOOD TEST: CPT

## 2021-01-17 RX ADMIN — HYDROXYZINE PAMOATE 25 MG: 25 CAPSULE ORAL at 08:39

## 2021-01-17 RX ADMIN — OLANZAPINE 2.5 MG: 2.5 TABLET, FILM COATED ORAL at 08:39

## 2021-01-17 RX ADMIN — AMLODIPINE BESYLATE 5 MG: 5 TABLET ORAL at 08:39

## 2021-01-17 RX ADMIN — METOPROLOL SUCCINATE 25 MG: 25 TABLET, EXTENDED RELEASE ORAL at 08:39

## 2021-01-17 RX ADMIN — OLANZAPINE 2.5 MG: 2.5 TABLET, FILM COATED ORAL at 21:16

## 2021-01-17 RX ADMIN — APIXABAN 2.5 MG: 2.5 TABLET, FILM COATED ORAL at 08:39

## 2021-01-17 RX ADMIN — APIXABAN 2.5 MG: 2.5 TABLET, FILM COATED ORAL at 21:16

## 2021-01-17 NOTE — PROGRESS NOTES
Progress Note  Date:2021       Room:[facility]  Patient Name:Trey Sullivan     YOB: 1940     Age:80 y.o. Subjective    Subjective Patient sitting in posey getting cleaned up, still with no clothes on has stooled all over himself and bed. Review of Systems   Unable to perform ROS: Dementia     Objective         Vitals Last 24 Hours:  TEMPERATURE:  Temp  Av.5 °F (36.4 °C)  Min: 96.5 °F (35.8 °C)  Max: 98 °F (36.7 °C)  RESPIRATIONS RANGE: Resp  Av.5  Min: 18  Max: 20  PULSE OXIMETRY RANGE: SpO2  Av.3 %  Min: 97 %  Max: 100 %  PULSE RANGE: Pulse  Av.8  Min: 76  Max: 89  BLOOD PRESSURE RANGE: Systolic (94WUP), WOK:564 , Min:114 , WMN:838   ; Diastolic (34EMA), FQE:21, Min:65, Max:84    I/O (24Hr): Intake/Output Summary (Last 24 hours) at 2021 1617  Last data filed at 2021 1845  Gross per 24 hour   Intake 900 ml   Output    Net 900 ml     Objective:  General Appearance:  Comfortable and in no acute distress. Vital signs: (most recent): Blood pressure 114/84, pulse 76, temperature 97.5 °F (36.4 °C), resp. rate 18, height 5' 10\" (1.778 m), weight 124.3 kg (274 lb), SpO2 98 %. Vital signs are normal.    Output: Producing urine and producing stool. HEENT: Normal HEENT exam.    Lungs:  Normal effort and normal respiratory rate. Breath sounds clear to auscultation. Heart: Normal rate. Regular rhythm. S1 normal and S2 normal.    Abdomen: Abdomen is soft. Bowel sounds are normal.   There is no abdominal tenderness. Extremities: Normal range of motion. Neurological: Patient is alert. (To self). Pupils:  Pupils are equal, round, and reactive to light. Skin:  Warm and dry. Labs/Imaging/Diagnostics    Labs:  CBC:No results for input(s): WBC, RBC, HGB, HCT, MCV, RDW, PLT, HGBEXT, HCTEXT, PLTEXT in the last 72 hours.   CHEMISTRIES:No results for input(s): NA, K, CL, CO2, BUN, CA, PHOS, MG in the last 72 hours.    No lab exists for component: CREATININE, GLUCOSEPT/INR:No results for input(s): INR, INREXT in the last 72 hours. No lab exists for component: PROTIME  APTT:No results for input(s): APTT in the last 72 hours. LIVER PROFILE:No results for input(s): AST, ALT in the last 72 hours. No lab exists for component: Osbaldo Maxim, ALKPHOS  Lab Results   Component Value Date/Time    ALT (SGPT) 30 12/28/2020 11:00 PM    AST (SGOT) 42 (H) 12/28/2020 11:00 PM    Alk. phosphatase 50 12/28/2020 11:00 PM    Bilirubin, total 0.7 12/28/2020 11:00 PM       Imaging Last 24 Hours:  No results found. Assessment//Plan   Active Problems:    Fever (12/29/2020)      AMS (altered mental status) (12/29/2020)      Encephalopathy acute (12/29/2020)      Assessment:  (Alzheimer's Dementia with agitation has haldol prn and vistaril  Fever resolved  Cad  hypertension  ). Plan:   (Continue current regimen  Case discussed with Dr. Kimo Foreman).        Electronically signed by Cherry Mendez NP on 1/17/2021 at 4:17 PM

## 2021-01-18 LAB
AMMONIA PLAS-SCNC: <10 UMOL/L
ANION GAP SERPL CALC-SCNC: 4 MMOL/L (ref 5–15)
BUN SERPL-MCNC: 40 MG/DL (ref 6–20)
BUN/CREAT SERPL: 22 (ref 12–20)
CA-I BLD-MCNC: 8.8 MG/DL (ref 8.5–10.1)
CHLORIDE SERPL-SCNC: 114 MMOL/L (ref 97–108)
CO2 SERPL-SCNC: 31 MMOL/L (ref 21–32)
CREAT SERPL-MCNC: 1.83 MG/DL (ref 0.7–1.3)
GLUCOSE SERPL-MCNC: 90 MG/DL (ref 65–100)
POTASSIUM SERPL-SCNC: 3.9 MMOL/L (ref 3.5–5.1)
SODIUM SERPL-SCNC: 149 MMOL/L (ref 136–145)

## 2021-01-18 PROCEDURE — 74011250637 HC RX REV CODE- 250/637: Performed by: INTERNAL MEDICINE

## 2021-01-18 PROCEDURE — 80048 BASIC METABOLIC PNL TOTAL CA: CPT

## 2021-01-18 PROCEDURE — 82140 ASSAY OF AMMONIA: CPT

## 2021-01-18 PROCEDURE — 74011250637 HC RX REV CODE- 250/637: Performed by: PSYCHIATRY & NEUROLOGY

## 2021-01-18 PROCEDURE — 36415 COLL VENOUS BLD VENIPUNCTURE: CPT

## 2021-01-18 PROCEDURE — 65270000029 HC RM PRIVATE

## 2021-01-18 RX ORDER — OLANZAPINE 5 MG/1
5 TABLET ORAL 2 TIMES DAILY
Status: DISCONTINUED | OUTPATIENT
Start: 2021-01-18 | End: 2021-01-22

## 2021-01-18 RX ADMIN — METOPROLOL SUCCINATE 25 MG: 25 TABLET, EXTENDED RELEASE ORAL at 08:42

## 2021-01-18 RX ADMIN — HYDROXYZINE PAMOATE 25 MG: 25 CAPSULE ORAL at 16:38

## 2021-01-18 RX ADMIN — APIXABAN 2.5 MG: 2.5 TABLET, FILM COATED ORAL at 21:28

## 2021-01-18 RX ADMIN — APIXABAN 2.5 MG: 2.5 TABLET, FILM COATED ORAL at 08:42

## 2021-01-18 RX ADMIN — OLANZAPINE 5 MG: 5 TABLET, FILM COATED ORAL at 21:28

## 2021-01-18 RX ADMIN — OLANZAPINE 2.5 MG: 2.5 TABLET, FILM COATED ORAL at 08:42

## 2021-01-18 RX ADMIN — AMLODIPINE BESYLATE 5 MG: 5 TABLET ORAL at 08:42

## 2021-01-18 NOTE — PROGRESS NOTES
CM received a call from MiriamShoptiquessalena with [facility] in [place], [place] stating patient's wife had reached out to her regarding placement. She requested clinicals be faxed to her at [phone]. Wife was agreeable to this. Ms. Milana Worrell stated once she received clinicals she would be able to appropriately assess the patient's needs and if they can accommodate him. They would also then discuss price. All information has been faxed, awaiting returned call. Also, left voice message with Anastasiia regarding [facility] in [place] to follow up on her conversation with patient's wife. Voice message left requesting returned call.

## 2021-01-18 NOTE — PROGRESS NOTES
Progress Note    Patient: Sandy Goyal MRN: [id number]  SSN: [id number]    YOB: 1940  Age: 68 y.o. Sex: male      Admit Date: 12/28/2020    LOS: 20 days     Subjective:   Patient is very confused. Is in [facility] bed and presently naked    Objective:     Vitals:    01/17/21 2040 01/17/21 2330 01/18/21 0843 01/18/21 1453   BP: 123/79 123/79 124/71 130/70   Pulse: 82 82 94 82   Resp: 18 18 18 20   Temp: 98.4 °F (36.9 °C) 98.4 °F (36.9 °C) 97.2 °F (36.2 °C) 97.9 °F (36.6 °C)   SpO2: 98%  98% 99%   Weight:       Height:            Intake and Output:  Current Shift: No intake/output data recorded. Last three shifts: 01/16 1901 - 01/18 0700  In: 600 [P.O.:600]  Out: -     Physical Exam:   General:  Confused and agitated   Eyes:     Ears:     Nose: Nares normal. Septum midline. Mucosa normal. No drainage or sinus tenderness. Mouth/Throat: Lips, mucosa, and tongue normal. Teeth and gums normal.   Neck: Supple, symmetrical, trachea midline, no adenopathy, thyroid: no enlargment/tenderness/nodules, no carotid bruit and no JVD. Back:   Symmetric, no curvature. ROM normal. No CVA tenderness. Lungs:   Clear to auscultation bilaterally. Heart:  Regular rate and rhythm, S1, S2 normal, no murmur, click, rub or gallop. Abdomen:   Soft, non-tender. Bowel sounds normal. No masses,  No organomegaly. Extremities: Extremities normal, atraumatic, no cyanosis or edema. Pulses: 2+ and symmetric all extremities. Skin: Skin color, texture, turgor normal. No rashes or lesions   Lymph nodes: Cervical, supraclavicular, and axillary nodes normal.   Neurologic: Agitated and confusedt. Lab/Data Review: All lab results for the last 24 hours reviewed.      Recent Results (from the past 24 hour(s))   CBC WITH AUTOMATED DIFF    Collection Time: 01/17/21  8:00 PM   Result Value Ref Range    WBC 6.7 4.1 - 11.1 K/uL    RBC 4.56 4.10 - 5.70 M/uL    HGB 13.9 12.1 - 17.0 g/dL    HCT 42.0 36.6 - 50.3 %    MCV 92.1 80.0 - 99.0 FL    MCH 30.5 26.0 - 34.0 PG    MCHC 33.1 30.0 - 36.5 g/dL    RDW 13.8 11.5 - 14.5 %    PLATELET 863 165 - 900 K/uL    MPV 10.4 8.9 - 12.9 FL    NEUTROPHILS 71 32 - 75 %    LYMPHOCYTES 19 12 - 49 %    MONOCYTES 7 5 - 13 %    EOSINOPHILS 3 0 - 7 %    BASOPHILS 0 0 - 1 %    IMMATURE GRANULOCYTES 0 0.0 - 0.5 %    ABS. NEUTROPHILS 4.7 1.8 - 8.0 K/UL    ABS. LYMPHOCYTES 1.3 0.8 - 3.5 K/UL    ABS. MONOCYTES 0.5 0.0 - 1.0 K/UL    ABS. EOSINOPHILS 0.2 0.0 - 0.4 K/UL    ABS. BASOPHILS 0.0 0.0 - 0.1 K/UL    ABS. IMM.  GRANS. 0.0 0.00 - 0.04 K/UL    DF AUTOMATED     METABOLIC PANEL, BASIC    Collection Time: 01/17/21  8:00 PM   Result Value Ref Range    Sodium 148 (H) 136 - 145 mmol/L    Potassium 3.7 3.5 - 5.1 mmol/L    Chloride 113 (H) 97 - 108 mmol/L    CO2 29 21 - 32 mmol/L    Anion gap 6 5 - 15 mmol/L    Glucose 113 (H) 65 - 100 mg/dL    BUN 46 (H) 6 - 20 mg/dL    Creatinine 2.01 (H) 0.70 - 1.30 mg/dL    BUN/Creatinine ratio 23 (H) 12 - 20      GFR est AA 39 (L) >60 ml/min/1.73m2    GFR est non-AA 32 (L) >60 ml/min/1.73m2    Calcium 9.1 8.5 - 10.1 mg/dL   GLUCOSE, POC    Collection Time: 01/17/21  8:43 PM   Result Value Ref Range    Glucose (POC) 122 (H) 65 - 100 mg/dL    Performed by Sidney LANGSTON          Assessment:     Active Problems:    Fever (12/29/2020)      AMS (altered mental status) (12/29/2020)      Encephalopathy acute (12/29/2020)      Severe dementia with agitation  Obtain UA  Plan:     Adjust patient's medications psychiatry following    Signed By: Kimberley Santiago MD     January 18, 2021

## 2021-01-18 NOTE — PROGRESS NOTES
Bedside shift change report given to [address] [place] [address] (oncoming nurse) by Shanta Dillard LPN(offgoing nurse). Report included the following information SBAR.

## 2021-01-19 LAB
ANION GAP SERPL CALC-SCNC: 7 MMOL/L (ref 5–15)
BUN SERPL-MCNC: 38 MG/DL (ref 6–20)
BUN/CREAT SERPL: 23 (ref 12–20)
CA-I BLD-MCNC: 9.3 MG/DL (ref 8.5–10.1)
CHLORIDE SERPL-SCNC: 113 MMOL/L (ref 97–108)
CO2 SERPL-SCNC: 29 MMOL/L (ref 21–32)
CREAT SERPL-MCNC: 1.66 MG/DL (ref 0.7–1.3)
GLUCOSE SERPL-MCNC: 93 MG/DL (ref 65–100)
POTASSIUM SERPL-SCNC: 4.3 MMOL/L (ref 3.5–5.1)
SODIUM SERPL-SCNC: 149 MMOL/L (ref 136–145)

## 2021-01-19 PROCEDURE — 74011250637 HC RX REV CODE- 250/637: Performed by: INTERNAL MEDICINE

## 2021-01-19 PROCEDURE — 36415 COLL VENOUS BLD VENIPUNCTURE: CPT

## 2021-01-19 PROCEDURE — 80048 BASIC METABOLIC PNL TOTAL CA: CPT

## 2021-01-19 PROCEDURE — 65270000029 HC RM PRIVATE

## 2021-01-19 RX ORDER — DIVALPROEX SODIUM 500 MG/1
500 TABLET, DELAYED RELEASE ORAL 2 TIMES DAILY
Status: DISCONTINUED | OUTPATIENT
Start: 2021-01-19 | End: 2021-01-22

## 2021-01-19 RX ADMIN — DIVALPROEX SODIUM 500 MG: 500 TABLET, DELAYED RELEASE ORAL at 21:11

## 2021-01-19 RX ADMIN — AMLODIPINE BESYLATE 5 MG: 5 TABLET ORAL at 09:47

## 2021-01-19 RX ADMIN — APIXABAN 2.5 MG: 2.5 TABLET, FILM COATED ORAL at 21:11

## 2021-01-19 RX ADMIN — OLANZAPINE 5 MG: 5 TABLET, FILM COATED ORAL at 21:11

## 2021-01-19 RX ADMIN — METOPROLOL SUCCINATE 25 MG: 25 TABLET, EXTENDED RELEASE ORAL at 09:47

## 2021-01-19 RX ADMIN — APIXABAN 2.5 MG: 2.5 TABLET, FILM COATED ORAL at 09:47

## 2021-01-19 RX ADMIN — OLANZAPINE 5 MG: 5 TABLET, FILM COATED ORAL at 09:47

## 2021-01-19 NOTE — MANAGEMENT PLAN
CM called and left message with Moisés Velázquezalthea [phone] to follow up on referral for [facility] and [facility]. MADDIE then spoke with Anastasiia at [facility] in [place] and she states she would love to take patient but she cannot get in touch with the patient's wife. CM called to speak with wife, Nadia Hernandez, [phone], but got no answer. CM was unable to leave message because voicemail is not set up.

## 2021-01-19 NOTE — MANAGEMENT PLAN
CM called again to [place] [phone] to follow up on referral for [facility] and [facility] but got no answer. MADDIE also called again to speak with Millie Saunders, [phone], but got no answer. This time MADDIE was able to leave a voicemail requesting a return call.

## 2021-01-19 NOTE — PROGRESS NOTES
Bedside and Verbal shift change report given to Amisha Curran RN (oncoming nurse) by Chris Bob RN (offgoing nurse). Report included the following information SBAR.

## 2021-01-19 NOTE — PROGRESS NOTES
Comprehensive Nutrition Assessment    Type and Reason for Visit: Reassess(Goal)    Nutrition Recommendations/Plan:     Continue NDD2/thin      Monitor need for additional supplementation      SLP eval as indicated     Obtain updated measured wt as pt out of [address] to document %meal and supplement intakes in I/Os    Nutrition Assessment:  Admitted for fever, AMS. Per chart review, NH staff reported pt more confused from baseline. Combative with staff, required restraints, now in [place] 119 bed. Psych following. COVID negative. D/c planning in progress. Ordered Cardiac diet earlier this admit, adjusted to Ground/thin d/t concerns for refusal to chew. Noted intakes of 100% x3 meal periods on 1/8, 1/12, 1/19 recorded. Intake of 90-95% per RN on last visit. RN today relayed pt only took bites at breakfast but verbalized not being a breakfast person at baseline, ate 100% of lunch. Denied need for supplementation or SLP workup, tolerating diet texture without difficulty. Labs: Na 149, BUN 40, Cr 1.83, BG WNL. Meds: bisacodyl, haldol. Malnutrition Assessment:  Malnutrition Status:  Mild malnutrition    Context:  Acute illness     Findings of the 6 clinical characteristics of malnutrition:   Energy Intake:  1 - 75% or less of est energy req for 7 or more days(d/t agitation)  Weight Loss:  No significant weight loss     Body Fat Loss:  Unable to assess,     Muscle Mass Loss:  Unable to assess,    Fluid Accumulation:  No significant fluid accumulation,      Nutrition Related Findings:  NFPE not performed. [facility] Appears well nourished, however on RD visual assessment, pt does not appear obese, slightly overwt. Needs updated measured once out of [facility]. No n/v or c/d. BM documented 1/16, formed. No BM today per RN. No edema.       Wounds:    (redness to elbow, hip, no open wounds)       Current Nutrition Therapies:  DIET DYSPHAGIA MECH ALTERED (NDD2)    Anthropometric Measures:  · Height:  5' 10\" (177.8 cm)  · Current Body Wt:  124.3 kg (274 lb 0.5 oz)     · Usual Body Wt:  (NAM)     · Ideal Body Wt:  166 lbs:  165.1 %   · BMI Category:  Obese class 2 (BMI 35.0-39. 9)     Wt Readings from Last 3 Encounters:   12/28/20 124.3 kg (274 lb)   12/05/20 124.3 kg (274 lb)   11/28/20 122.5 kg (270 lb)   All wts appear stated. Pt appears to weigh much less on visual assessment, will f/u for measured wt as out of [facility].     Nutrition Diagnosis:   · Biting/chewing (masticatory) difficulty related to cognitive or neurological impairment as evidenced by (RN rec'd [facility])      Nutrition Interventions:   Food and/or Nutrient Delivery: Continue current diet  Nutrition Education and Counseling: Education not appropriate  Coordination of Nutrition Care: Continue to monitor while inpatient    Goals:  Meet >75% EENs x 5-7 days (met)  Maintain skin integrity   (met)    Nutrition Monitoring and Evaluation:   Behavioral-Environmental Outcomes: None identified  Food/Nutrient Intake Outcomes: Food and nutrient intake  Physical Signs/Symptoms Outcomes: Chewing or swallowing, GI status    Discharge Planning:    No discharge needs at this time     Electronically signed by Molina Caraballo on 1/19/2021 at 3:21 PM    Contact: EXT [phone]

## 2021-01-20 LAB
BASOPHILS # BLD: 0 K/UL (ref 0–0.1)
BASOPHILS NFR BLD: 0 % (ref 0–1)
DIFFERENTIAL METHOD BLD: NORMAL
EOSINOPHIL # BLD: 0.2 K/UL (ref 0–0.4)
EOSINOPHIL NFR BLD: 4 % (ref 0–7)
ERYTHROCYTE [DISTWIDTH] IN BLOOD BY AUTOMATED COUNT: 13.7 % (ref 11.5–14.5)
GLUCOSE BLD STRIP.AUTO-MCNC: 110 MG/DL (ref 65–100)
HCT VFR BLD AUTO: 43.3 % (ref 36.6–50.3)
HGB BLD-MCNC: 14.3 G/DL (ref 12.1–17)
IMM GRANULOCYTES # BLD AUTO: 0 K/UL (ref 0–0.04)
IMM GRANULOCYTES NFR BLD AUTO: 0 % (ref 0–0.5)
LYMPHOCYTES # BLD: 1.1 K/UL (ref 0.8–3.5)
LYMPHOCYTES NFR BLD: 21 % (ref 12–49)
MCH RBC QN AUTO: 30.3 PG (ref 26–34)
MCHC RBC AUTO-ENTMCNC: 33 G/DL (ref 30–36.5)
MCV RBC AUTO: 91.7 FL (ref 80–99)
MONOCYTES # BLD: 0.4 K/UL (ref 0–1)
MONOCYTES NFR BLD: 7 % (ref 5–13)
NEUTS SEG # BLD: 3.6 K/UL (ref 1.8–8)
NEUTS SEG NFR BLD: 68 % (ref 32–75)
NRBC # BLD: 0 K/UL (ref 0–0.01)
NRBC BLD-RTO: 0 PER 100 WBC
PERFORMED BY, TECHID: ABNORMAL
PLATELET # BLD AUTO: 222 K/UL (ref 150–400)
PMV BLD AUTO: 10.6 FL (ref 8.9–12.9)
RBC # BLD AUTO: 4.72 M/UL (ref 4.1–5.7)
WBC # BLD AUTO: 5.3 K/UL (ref 4.1–11.1)

## 2021-01-20 PROCEDURE — 74011250637 HC RX REV CODE- 250/637: Performed by: INTERNAL MEDICINE

## 2021-01-20 PROCEDURE — 65270000029 HC RM PRIVATE

## 2021-01-20 PROCEDURE — 85025 COMPLETE CBC W/AUTO DIFF WBC: CPT

## 2021-01-20 PROCEDURE — 74011250637 HC RX REV CODE- 250/637: Performed by: PSYCHIATRY & NEUROLOGY

## 2021-01-20 PROCEDURE — 36415 COLL VENOUS BLD VENIPUNCTURE: CPT

## 2021-01-20 PROCEDURE — 82962 GLUCOSE BLOOD TEST: CPT

## 2021-01-20 RX ADMIN — METOPROLOL SUCCINATE 25 MG: 25 TABLET, EXTENDED RELEASE ORAL at 09:42

## 2021-01-20 RX ADMIN — OLANZAPINE 5 MG: 5 TABLET, FILM COATED ORAL at 23:07

## 2021-01-20 RX ADMIN — APIXABAN 2.5 MG: 2.5 TABLET, FILM COATED ORAL at 23:07

## 2021-01-20 RX ADMIN — OLANZAPINE 5 MG: 5 TABLET, FILM COATED ORAL at 09:42

## 2021-01-20 RX ADMIN — DIVALPROEX SODIUM 500 MG: 500 TABLET, DELAYED RELEASE ORAL at 09:00

## 2021-01-20 RX ADMIN — DIVALPROEX SODIUM 500 MG: 500 TABLET, DELAYED RELEASE ORAL at 23:07

## 2021-01-20 RX ADMIN — HYDROXYZINE PAMOATE 25 MG: 25 CAPSULE ORAL at 09:42

## 2021-01-20 RX ADMIN — AMLODIPINE BESYLATE 5 MG: 5 TABLET ORAL at 09:42

## 2021-01-20 RX ADMIN — APIXABAN 2.5 MG: 2.5 TABLET, FILM COATED ORAL at 09:42

## 2021-01-20 NOTE — PROGRESS NOTES
PT reassessment attempted today at 12:20. Pt found in posy bed with his back against back R post sleeping in a seated slumped position. Pt did not respond to verbal stimuli. Pt did move RLE when PT touched RLE. Pt continued to not respond to verbal stimuli and PT was not able to reach into posy and attempt sternal rub. PT will try PT reassessment at another time.

## 2021-01-20 NOTE — PROGRESS NOTES
Progress Note    Patient: Tomas Cotto MRN: [id number]  SSN: [id number]    YOB: 1940  Age: 68 y.o. Sex: male      Admit Date: 12/28/2020    LOS: 21 days     Subjective:     Patient is  Naked. Is in [facility] bed    Objective:     Vitals:    01/18/21 1453 01/18/21 1908 01/19/21 0749 01/19/21 1513   BP: 130/70 112/67 131/85 135/83   Pulse: 82 83 87 86   Resp: 20 20 18 18   Temp: 97.9 °F (36.6 °C) 97.5 °F (36.4 °C) 97.4 °F (36.3 °C) 98 °F (36.7 °C)   SpO2: 99% 95% 99% 100%   Weight:       Height:            Intake and Output:  Current Shift: No intake/output data recorded. Last three shifts: 01/18 0701 - 01/19 1900  In: 240 [P.O.:240]  Out: 200 [Urine:200]    Physical Exam:   General:   psychotic. Eyes:  Conjunctivae/corneas clear. PERRL, EOMs intact. Fundi benign   Ears:  Normal TMs and external ear canals both ears. Nose: Nares normal. Septum midline. Mucosa normal. No drainage or sinus tenderness. Mouth/Throat: Lips, mucosa, and tongue normal. Teeth and gums normal.   Neck: Supple, symmetrical, trachea midline, no adenopathy, thyroid: no enlargment/tenderness/nodules, no carotid bruit and no JVD. Back:   Symmetric, no curvature. ROM normal. No CVA tenderness. Lungs:   Clear to auscultation bilaterally. Heart:  Regular rate and rhythm, S1, S2 normal, no murmur, click, rub or gallop. Abdomen:   Soft, non-tender. Bowel sounds normal. No masses,  No organomegaly. Extremities: Extremities normal, atraumatic, no cyanosis or edema. Pulses: 2+ and symmetric all extremities. Skin: Skin color, texture, turgor normal. No rashes or lesions   Lymph nodes: Cervical, supraclavicular, and axillary nodes normal.   Neurologic:  Psychotic       Lab/Data Review: All lab results for the last 24 hours reviewed. No results found for this or any previous visit (from the past 24 hour(s)).       Assessment:     Active Problems:    Fever (12/29/2020)      AMS (altered mental status) (12/29/2020)      Encephalopathy acute (12/29/2020)    Psychosis with end-stage dementia    [facility]   [facility]    Signed By: Mansoor Mueller MD     January 19, 2021

## 2021-01-20 NOTE — PROGRESS NOTES
PT teratment held today at nurse's request. Nursing reports that pt is having A Flutter today and increased heart rate. Will hold off for today and try again at another time.

## 2021-01-20 NOTE — PROGRESS NOTES
CM spoke with patient's wife, Eduardo Vera at [phone] (home). She stated she has a meeting today with Ange Chavez from [facility] at 10:30am to sign paperwork and potentially make payment. Ms. Martha Fuentes stated she believes she is able to afford this facility and is meeting with the rep at 10:30am this morning. After meeting, MADDIE will follow up with Alo Jacinto from the [facility] at [phone]. DC Plan: Memory Care Unit at [facility] and [place] as soon as payment has been made and bed is available.

## 2021-01-21 LAB
APPEARANCE UR: CLEAR
BACTERIA URNS QL MICRO: NEGATIVE /HPF
BILIRUB UR QL: NEGATIVE
COLOR UR: ABNORMAL
GLUCOSE UR STRIP.AUTO-MCNC: NEGATIVE MG/DL
HGB UR QL STRIP: ABNORMAL
HYALINE CASTS URNS QL MICRO: ABNORMAL /LPF (ref 0–5)
KETONES UR QL STRIP.AUTO: 5 MG/DL
LEUKOCYTE ESTERASE UR QL STRIP.AUTO: NEGATIVE
MUCOUS THREADS URNS QL MICRO: ABNORMAL /LPF
NITRITE UR QL STRIP.AUTO: NEGATIVE
PH UR STRIP: 5 [PH] (ref 5–8)
PROT UR STRIP-MCNC: 30 MG/DL
RBC #/AREA URNS HPF: >100 /HPF (ref 0–5)
SP GR UR REFRACTOMETRY: 1.03 (ref 1–1.03)
UA: UC IF INDICATED,UAUC: ABNORMAL
UROBILINOGEN UR QL STRIP.AUTO: 2 EU/DL (ref 0.1–1)
WBC URNS QL MICRO: ABNORMAL /HPF (ref 0–4)

## 2021-01-21 PROCEDURE — 97530 THERAPEUTIC ACTIVITIES: CPT

## 2021-01-21 PROCEDURE — 81001 URINALYSIS AUTO W/SCOPE: CPT

## 2021-01-21 PROCEDURE — 65270000029 HC RM PRIVATE

## 2021-01-21 PROCEDURE — 87086 URINE CULTURE/COLONY COUNT: CPT

## 2021-01-21 PROCEDURE — 74011250637 HC RX REV CODE- 250/637: Performed by: PSYCHIATRY & NEUROLOGY

## 2021-01-21 PROCEDURE — 74011250637 HC RX REV CODE- 250/637: Performed by: INTERNAL MEDICINE

## 2021-01-21 RX ADMIN — METOPROLOL SUCCINATE 25 MG: 25 TABLET, EXTENDED RELEASE ORAL at 08:45

## 2021-01-21 RX ADMIN — APIXABAN 2.5 MG: 2.5 TABLET, FILM COATED ORAL at 08:45

## 2021-01-21 RX ADMIN — OLANZAPINE 5 MG: 5 TABLET, FILM COATED ORAL at 22:28

## 2021-01-21 RX ADMIN — DIVALPROEX SODIUM 500 MG: 500 TABLET, DELAYED RELEASE ORAL at 22:28

## 2021-01-21 RX ADMIN — HYDROXYZINE PAMOATE 25 MG: 25 CAPSULE ORAL at 08:45

## 2021-01-21 RX ADMIN — OLANZAPINE 5 MG: 5 TABLET, FILM COATED ORAL at 08:45

## 2021-01-21 RX ADMIN — DIVALPROEX SODIUM 500 MG: 500 TABLET, DELAYED RELEASE ORAL at 08:45

## 2021-01-21 RX ADMIN — AMLODIPINE BESYLATE 5 MG: 5 TABLET ORAL at 08:45

## 2021-01-21 RX ADMIN — HYDROXYZINE PAMOATE 25 MG: 25 CAPSULE ORAL at 22:28

## 2021-01-21 RX ADMIN — APIXABAN 2.5 MG: 2.5 TABLET, FILM COATED ORAL at 22:28

## 2021-01-21 NOTE — PROGRESS NOTES
Problem: Falls - Risk of  Goal: *Absence of Falls  Description: Document [place] Flow Fall Risk and appropriate interventions in the flowsheet. Outcome: Progressing Towards Goal  Note: Fall Risk Interventions:Bed is in the lowest position and wheels are locked, call bell is within reach, bathroom light is on during evening hours, gripper socks are on and patient has been instructed to call out for assistance if needed. As of now, patient is free from falls and will continue to be monitored. Bedside shift change report given to Teofilo Anderson (oncoming nurse) by Yaniv Glasgow RN (offgoing nurse). Report included the following information SBAR, Kardex, Intake/Output and MAR.

## 2021-01-21 NOTE — PROGRESS NOTES
MADDIE followed up with Siria Limon from [facility] ([phone]). She stated they are hoping to accept the patient on Monday. They will need a new covid test and updated clinicals. Updated clinicals have been faxed to her at [phone]. She also stated she has some paperwork that will need to be completed by the attending physician which she is going to e-mail to MADDIE. Once completed will fax back to her. DC plan: [facility] on Monday, pending repeat negative covid.

## 2021-01-21 NOTE — PROGRESS NOTES
Progress Note    Patient: Myron Taylor MRN: [id number]  SSN: [id number]    YOB: 1940  Age: 68 y.o. Sex: male      Admit Date: 12/28/2020    LOS: 22 days     Subjective:   Patient is alert still talking out of thehead    Objective:     Vitals:    01/19/21 1513 01/19/21 2052 01/19/21 2247 01/20/21 1507   BP: 135/83 (!) 159/79 108/73 125/70   Pulse: 86 85 73 80   Resp: 18 18 18 18   Temp: 98 °F (36.7 °C) 97.7 °F (36.5 °C) (!) 45.1 °F (7.3 °C) 98.1 °F (36.7 °C)   SpO2: 100% 98% 98% 98%   Weight:       Height:            Intake and Output:  Current Shift: No intake/output data recorded. Last three shifts: 01/19 0701 - 01/20 1900  In: 240 [P.O.:240]  Out: 200 [Urine:200]    Physical Exam:   General:  Alert, cooperative, no distress, appears stated age. Eyes:  Conjunctivae/corneas clear. PERRL, EOMs intact. Fundi benign   Ears:  Normal TMs and external ear canals both ears. Nose: Nares normal. Septum midline. Mucosa normal. No drainage or sinus tenderness. Mouth/Throat: Lips, mucosa, and tongue normal. Teeth and gums normal.   Neck: Supple, symmetrical, trachea midline, no adenopathy, thyroid: no enlargment/tenderness/nodules, no carotid bruit and no JVD. Back:   Symmetric, no curvature. ROM normal. No CVA tenderness. Lungs:   Clear to auscultation bilaterally. Heart:  Regular rate and rhythm, S1, S2 normal, no murmur, click, rub or gallop. Abdomen:   Soft, non-tender. Bowel sounds normal. No masses,  No organomegaly. Extremities: Extremities normal, atraumatic, no cyanosis or edema. Pulses: 2+ and symmetric all extremities. Skin: Skin color, texture, turgor normal. No rashes or lesions   Lymph nodes: Cervical, supraclavicular, and axillary nodes normal.   Neurologic: CNII-XII intact. Normal strength, sensation and reflexes throughout. Lab/Data Review: All lab results for the last 24 hours reviewed.      Recent Results (from the past 24 hour(s))   METABOLIC PANEL, BASIC    Collection Time: 01/19/21  9:28 PM   Result Value Ref Range    Sodium 149 (H) 136 - 145 mmol/L    Potassium 4.3 3.5 - 5.1 mmol/L    Chloride 113 (H) 97 - 108 mmol/L    CO2 29 21 - 32 mmol/L    Anion gap 7 5 - 15 mmol/L    Glucose 93 65 - 100 mg/dL    BUN 38 (H) 6 - 20 mg/dL    Creatinine 1.66 (H) 0.70 - 1.30 mg/dL    BUN/Creatinine ratio 23 (H) 12 - 20      GFR est AA 49 (L) >60 ml/min/1.73m2    GFR est non-AA 40 (L) >60 ml/min/1.73m2    Calcium 9.3 8.5 - 10.1 mg/dL   GLUCOSE, POC    Collection Time: 01/20/21 11:26 AM   Result Value Ref Range    Glucose (POC) 110 (H) 65 - 100 mg/dL    Performed by Juice NEWELL          Assessment:     Active Problems:    Fever (12/29/2020)      AMS (altered mental status) (12/29/2020)      Encephalopathy acute (12/29/2020)    Psychosis acute  Plan:   Continue treatment    Signed By: Mary Coppola MD     January 20, 2021

## 2021-01-21 NOTE — PROGRESS NOTES
Problem: Mobility Impaired (Adult and Pediatric)  Goal: *Acute Goals and Plan of Care (Insert Text)  Description: Pt will be I with LE HEP in 7 days. Pt will perform bed mobility with CGA in 7 days. Pt will perform transfers with CGA in 7 days. Pt will amb 150 feet with LRAD safely with CGA in 7 days. Outcome: Progressing Towards Goal     Problem: Patient Education: Go to Patient Education Activity  Goal: Patient/Family Education  Outcome: Progressing Towards Goal   PHYSICAL THERAPY REEVALUATION  Patient: Blanca Kelly (52 y.o. male)  Date: 1/21/2021  Primary Diagnosis: AMS (altered mental status) [R41.82]  Fever [R50.9]  Encephalopathy acute [G93.40]        Precautions: AMS, posey bed, confusion       ASSESSMENT  Based on the objective data described below, the patient presents with Pt presents with decr ROM, strength, bed mobility, transfers, gait, balance, activity tolerance, safety awareness, and functional mobility. Pt agreeable to PT re-assessment, Pt was evaluated by PT on 1/2/21, last re-assessed on 1/9/21, and has participated in 1 treatment sessions since then. Pt is progressing well towards all goals, pt presents with varied level of assist needed in PT treatment sessions. Previously amb 400ft with RW and CGA. A&O x 1 orient to name. Pt received sitting upright in posey bed soiled. PT assisted in cleaning of Pt and bedding. Pt completed bed mobility with CGA/Min A, transfers with Max A, and amb approx 3 ft with RW and Max A. Pt has good activity tolerance. Pt would benefit from continued skilled PT services to address above deficits and progress towards goals. PT d/c recc SNF vs LTC when medically appropriate. Current Level of Function Impacting Discharge (mobility/balance): incr need for assistance, variable level of assist, confusion     Patient will benefit from skilled therapy intervention to address the above noted impairments.        PLAN :  Recommendations and Planned Interventions: bed mobility training, transfer training, gait training, therapeutic exercises, neuromuscular re-education, patient and family training/education, and therapeutic activities      Frequency/Duration: Patient will be followed by physical therapy:  2 times a week to address goals. Recommendation for discharge: (in order for the patient to meet his/her long term goals)  PT d/c recc SNF vs LTC when medically appropriate. This discharge recommendation:  Has been made in collaboration with the attending provider and/or case management    Equipment recommendations for successful discharge (if) home: TBD         SUBJECTIVE:   Patient stated I have a secret, come close, I had an accident.     OBJECTIVE DATA SUMMARY:   HISTORY:    Past Medical History:   Diagnosis Date    Alzheimer disease ([facility]     CAD (coronary artery disease)     Dementia ([facility]     Psychiatric disorder    History reviewed. No pertinent surgical history. Hospital course since last seen and reason for reevaluation: Pt was evaluated by PT on 1/2/21, last re-assessed on 1/9/21, and has participated in 1 treatment sessions since then. Pt is progressing well towards all goals, pt presents with varied level of assist needed in PT treatment sessions.     Personal factors and/or comorbidities impacting plan of care: complicated medical history, multiple co-morbidities    Home Situation  Home Environment: [address] [place] [address] Name: ([address] [place] [address])  One/Two Story Residence: One story  Living Alone: Yes  Support Systems: Skilled nursing facility  Patient Expects to be Discharged to[de-identified] [facility]  Current DME Used/Available at Home: Hospital bed(uncertain of other equipment)    EXAMINATION/PRESENTATION/DECISION MAKING:   Critical Behavior:  Neurologic State: Alert  Orientation Level: Oriented to person  Cognition: Decreased command following, Decreased attention/concentration, Impaired decision making, Poor safety awareness  Safety/Judgement: Decreased awareness of environment, Fall prevention  Hearing: Auditory  Auditory Impairment: None  Range Of Motion:  AROM: Generally decreased, functional   Strength:    Strength: Generally decreased, functional   Functional Mobility:  Bed Mobility:  Rolling: Supervision  Supine to Sit: Minimum assistance;Contact guard assistance   Scooting: Contact guard assistance;Minimum assistance  Transfers:  Sit to Stand: Maximum assistance  Stand to Sit: Maximum assistance  Bed to Chair: Maximum assistance      Balance:   Sitting: Intact; With support  Sitting - Static: Fair (occasional)  Sitting - Dynamic: Fair (occasional)  Standing: Impaired;Pull to stand; With support  Standing - Static: Poor  Standing - Dynamic : Poor  Ambulation/Gait Training:  Distance (ft): 3 Feet (ft)  Assistive Device: Gait belt;Walker, rolling  Ambulation - Level of Assistance: Maximum assistance   Gait Abnormalities: Step to gait; Decreased step clearance   Base of Support: Narrowed  Speed/Priti: Shuffled;Pace decreased (<100 feet/min); Slow  Step Length: Right shortened;Left shortened   Pt received sitting upright in posey bed soiled. PT assisted in cleaning of Pt and bedding. Pt completed bed mobility with CGA/Min A, transfers with Max A, and amb approx 3 ft with RW and Max A from bed to chair. Pt req max cues for hand placement, sequencing, orient to task, walker management, gait pattern, and safety to prevent LOB/falls during mobility. Pt presents with slow, shuffling, unsteady gait and significant retro lean in standing. Pt has good activity tolerance. Pain Ratin/10    Activity Tolerance:   Good  Please refer to the flowsheet for vital signs taken during this treatment. After treatment patient left in no apparent distress:   Sitting in chair and Bed / chair alarm activated table in front    COMMUNICATION/EDUCATION:   The patients plan of care was discussed with: Registered nurse.      Fall prevention education was provided and the patient/caregiver indicated understanding. and Patient/family have participated as able in goal setting and plan of care.     Thank you for this referral.  Kacie Mcneal, PT   Time Calculation: 24 mins

## 2021-01-22 LAB
BACTERIA SPEC CULT: NORMAL
SPECIAL REQUESTS,SREQ: NORMAL

## 2021-01-22 PROCEDURE — 97535 SELF CARE MNGMENT TRAINING: CPT

## 2021-01-22 PROCEDURE — 74011250637 HC RX REV CODE- 250/637: Performed by: INTERNAL MEDICINE

## 2021-01-22 PROCEDURE — U0003 INFECTIOUS AGENT DETECTION BY NUCLEIC ACID (DNA OR RNA); SEVERE ACUTE RESPIRATORY SYNDROME CORONAVIRUS 2 (SARS-COV-2) (CORONAVIRUS DISEASE [COVID-19]), AMPLIFIED PROBE TECHNIQUE, MAKING USE OF HIGH THROUGHPUT TECHNOLOGIES AS DESCRIBED BY CMS-2020-01-R: HCPCS

## 2021-01-22 PROCEDURE — 74011250636 HC RX REV CODE- 250/636: Performed by: INTERNAL MEDICINE

## 2021-01-22 PROCEDURE — 65270000029 HC RM PRIVATE

## 2021-01-22 RX ADMIN — APIXABAN 2.5 MG: 2.5 TABLET, FILM COATED ORAL at 20:56

## 2021-01-22 RX ADMIN — APIXABAN 2.5 MG: 2.5 TABLET, FILM COATED ORAL at 08:48

## 2021-01-22 RX ADMIN — HALOPERIDOL LACTATE 1 MG: 5 INJECTION, SOLUTION INTRAMUSCULAR at 20:56

## 2021-01-22 RX ADMIN — HALOPERIDOL LACTATE 1 MG: 5 INJECTION, SOLUTION INTRAMUSCULAR at 03:00

## 2021-01-22 RX ADMIN — OLANZAPINE 5 MG: 5 TABLET, FILM COATED ORAL at 08:48

## 2021-01-22 RX ADMIN — DIVALPROEX SODIUM 750 MG: 500 TABLET, DELAYED RELEASE ORAL at 20:56

## 2021-01-22 RX ADMIN — METOPROLOL SUCCINATE 25 MG: 25 TABLET, EXTENDED RELEASE ORAL at 08:48

## 2021-01-22 RX ADMIN — DIVALPROEX SODIUM 500 MG: 500 TABLET, DELAYED RELEASE ORAL at 08:48

## 2021-01-22 RX ADMIN — OLANZAPINE 7.5 MG: 5 TABLET, FILM COATED ORAL at 20:56

## 2021-01-22 RX ADMIN — AMLODIPINE BESYLATE 5 MG: 5 TABLET ORAL at 08:48

## 2021-01-22 NOTE — PROGRESS NOTES
Problem: Self Care Deficits Care Plan (Adult)  Goal: *Acute Goals and Plan of Care (Insert Text)  Description: Pt will be SBA sup<->sit in prep for EOB ADL's  Pt will be SBA  LB dressing EOB level  Pt will be SBA  sit<-> prep for toilet transfer  Pt will be SBA  toilet transfer with LRAD  Pt will be SBA  toileting/cloth mgmt LRAD  Pt will be SBA  grooming standing sink  Pt will be SBA bathing sitting/standing sink LRAD  Pt will be SBA ryan UE HEP in prep for self care tasks  Outcome: Not Met/1/22/21  Note: New/revised goals; Patient will be mod  assist with eating tasks  Patient will wash face with max assist  Patient will be max A with combing his hair     OCCUPATIONAL THERAPY RE-EVALUATION  Patient: Sue Miller (41 y.o. male)  Date: 1/22/2021  Diagnosis: AMS (altered mental status) [R41.82]  Fever [R50.9]  Encephalopathy acute [G93.40] <principal problem not specified>       Precautions:    Chart, occupational therapy assessment, plan of care, and goals were reviewed. ASSESSMENT  Based on the objective data described below, patient has not met goals at this time. Patient is overall dependent in all self care activities. Patient disoriented X 4 during treatment session. Patient cooperative and pleasant. intermittently follows some simple verbal  and tactile commands. Kem Rathke RYAN UE AROm/strength is diminished but WFL's. Current Level of Function Impacting Discharge (ADLs): patient is dependent overall in all self care activities    Other factors to consider for discharge: time since onset; co-morbidities; support system         PLAN :  Recommendations and Planned Interventions: self care training, functional mobility training, and therapeutic exercise    Frequency/Duration: Patient will be followed by occupational therapy 2 times a week to address goals.     Recommend with staff: encourage patient to engage in simple ADL tasks with tactile and simple, 1 step verbal commands    Recommend next OT session: feeding/grooming activities    Recommendation for discharge: (in order for the patient to meet his/her long term goals)  To be determined: Long term care    This discharge recommendation:  A follow-up discussion with the attending provider and/or case management is planned    Equipment recommendations for successful discharge (if) home: none       SUBJECTIVE:   Patient stated that's good.  re; chocolate pudding    OBJECTIVE DATA SUMMARY:   Hospital course since last seen and reason for reevaluation:  Patient evaluated on 1/3/21. No OT treatment since re-evaluation on 1/10/21. Re-evaluation for d/c planning, continuation of care. Recommend 2 times a week, goals revised due to cognitive level. Cognitive/Behavioral Status:  Neurologic State: Alert  Orientation Level: Disoriented X4  Cognition: Decreased attention/concentration;Decreased command following             Skin: appeared intact  Hearing: Auditory  Auditory Impairment: None    Vision/Perceptual:            Not formally assessed    Range of Motion: Nighat UE;   AROM: Generally decreased, functional          Strength:  NIGHAT UE:  Strength: Generally decreased, functional   Coordination:       UE--not formally assessed but appears WFL's        Tone & Sensation: Nighat UE   Tone-normal  Sensation--not formally assessed but appears intact      Functional Mobility and Transfers for ADLs:    Balance:  Sitting: Intact  Long sitting in bed    ADL Assessment:  Feeding: Maximum assistance    Oral Facial Hygiene/Grooming: Total assistance    Lower Body Dressing: Total assistance       ADL Intervention and task modifications:  Feeding  Feeding Assistance: Maximum assistance  Container Management: Total assistance (dependent)  Food to Mouth: Total assistance (dependent)  Drink to Mouth: Moderate assistance         Lower Body Dressing Assistance  Dressing Assistance: Total assistance(dependent)  Socks:  Total assistance (dependent)  Position Performed: Long sitting on bed       Functional Measure:    [address] [place] [address] AM-PACTM \"6 Clicks\"                                                       Daily Activity Inpatient Short Form  How much help from another person does the patient currently need. .. Total; A Lot A Little None   1. Putting on and taking off regular lower body clothing? [x]  1 []  2 []  3 []  4   2. Bathing (including washing, rinsing, drying)? [x]  1 []  2 []  3 []  4   3. Toileting, which includes using toilet, bedpan or urinal? [x] 1 []  2 []  3 []  4   4. Putting on and taking off regular upper body clothing? [x]  1 []  2 []  3 []  4   5. Taking care of personal grooming such as brushing teeth? [x]  1 []  2 []  3 []  4   6. Eating meals? []  1 [x]  2 []  3 []  4   © 2007, Trustees of [address] [place] [address], under license to [employer]. All rights reserved     Score: 7/24     Interpretation of Tool:  Represents clinically-significant functional categories (i.e. Activities of daily living). Percentage of Impairment CH    0%   CI    1-19% CJ    20-39% CK    40-59% CL    60-79% CM    80-99% CN     100%   [facility]  Score 6-24 24 23 20-22 15-19 10-14 7-9 6        Pain:  0/10    Activity Tolerance:   Fair  Please refer to the flowsheet for vital signs taken during this treatment. After treatment patient left in no apparent distress:   Sitting up in posey bed    COMMUNICATION/COLLABORATION:   The patients plan of care was discussed with: Case management.      Bill Kuhn OTR/L  Time Calculation: 18 mins

## 2021-01-22 NOTE — PROGRESS NOTES
Progress Note    Patient: Sue Miller MRN: [id number]  SSN: [id number]    YOB: 1940  Age: 68 y.o. Sex: male      Admit Date: 12/28/2020    LOS: 24 days     Subjective:   Patient is naked still confused. Objective:     Vitals:    01/21/21 1600 01/21/21 2137 01/22/21 0840 01/22/21 1546   BP: 127/71 130/71 (!) 140/69 130/68   Pulse: 80 81 78 81   Resp: 20 18 18 18   Temp: 97.3 °F (36.3 °C) 97.4 °F (36.3 °C) 97.8 °F (36.6 °C) 97.8 °F (36.6 °C)   SpO2: 99% 97% 98% 98%   Weight:       Height:            Intake and Output:  Current Shift: No intake/output data recorded. Last three shifts: 01/20 1901 - 01/22 0700  In: 1600 [P.O.:1600]  Out: 125 [Urine:125]    Physical Exam:   General:  Alert, cooperative, no distress, appears stated age. Eyes:  Conjunctivae/corneas clear. PERRL, EOMs intact. Fundi benign   Ears:  Normal TMs and external ear canals both ears. Nose: Nares normal. Septum midline. Mucosa normal. No drainage or sinus tenderness. Mouth/Throat: Lips, mucosa, and tongue normal. Teeth and gums normal.   Neck: Supple, symmetrical, trachea midline, no adenopathy, thyroid: no enlargment/tenderness/nodules, no carotid bruit and no JVD. Back:   Symmetric, no curvature. ROM normal. No CVA tenderness. Lungs:   Clear to auscultation bilaterally. Heart:  Regular rate and rhythm, S1, S2 normal, no murmur, click, rub or gallop. Abdomen:   Soft, non-tender. Bowel sounds normal. No masses,  No organomegaly. Extremities: Extremities normal, atraumatic, no cyanosis or edema. Pulses: 2+ and symmetric all extremities. Skin: Skin color, texture, turgor normal. No rashes or lesions   Lymph nodes: Cervical, supraclavicular, and axillary nodes normal.   Neurologic: CNII-XII intact. Normal strength, sensation and reflexes throughout. Lab/Data Review: All lab results for the last 24 hours reviewed.      No results found for this or any previous visit (from the past 24 hour(s)).       Assessment:     Active Problems:    Fever (12/29/2020)      AMS (altered mental status) (12/29/2020)      Encephalopathy acute (12/29/2020)    Psychosis acute  Plan:   Adjust Zyprexa and Depakote    Signed By: Misael Orozco MD     January 22, 2021

## 2021-01-22 NOTE — PROGRESS NOTES
Patient is pending intermediate paperwork to be completed by physician. Paperwork is on front of patients chart. CM notified Attending physician. Patient is accepted at   [facility] on Monday.   Pending Repeat Covid (This has been ordered)

## 2021-01-22 NOTE — PROGRESS NOTES
Progress Note    Patient: Christin Carpenter MRN: [id number]  SSN: [id number]    YOB: 1940  Age: 68 y.o. Sex: male      Admit Date: 12/28/2020    LOS: 23 days     Subjective:   Acute psychosis with dementia    Objective:     Vitals:    01/20/21 1507 01/20/21 2105 01/21/21 0327 01/21/21 1600   BP: 125/70 126/63 117/62 127/71   Pulse: 80 70 91 80   Resp: 18 18 20 20   Temp: 98.1 °F (36.7 °C) 98.7 °F (37.1 °C) 97.9 °F (36.6 °C) 97.3 °F (36.3 °C)   SpO2: 98% 94% 99% 99%   Weight:       Height:            Intake and Output:  Current Shift: No intake/output data recorded. Last three shifts: 01/20 0701 - 01/21 1900  In: -   Out: 125 [Urine:125]    Physical Exam:   General:  Alert, but confused. Eyes:  Conjunctivae/corneas clear. PERRL, EOMs intact. Fundi benign   Ears:  Normal TMs and external ear canals both ears. Nose: Nares normal. Septum midline. Mucosa normal. No drainage or sinus tenderness. Mouth/Throat: Lips, mucosa, and tongue normal. Teeth and gums normal.   Neck: Supple, symmetrical, trachea midline, no adenopathy, thyroid: no enlargment/tenderness/nodules, no carotid bruit and no JVD. Back:   Symmetric, no curvature. ROM normal. No CVA tenderness. Lungs:   Clear to auscultation bilaterally. Heart:  Regular rate and rhythm, S1, S2 normal, no murmur, click, rub or gallop. Abdomen:   Soft, non-tender. Bowel sounds normal. No masses,  No organomegaly. Extremities: Extremities normal, atraumatic, no cyanosis or edema. Pulses: 2+ and symmetric all extremities. Skin: Skin color, texture, turgor normal. No rashes or lesions   Lymph nodes: Cervical, supraclavicular, and axillary nodes normal.   Neurologic: CNII-XII intact. Normal strength, sensation and reflexes throughout. Lab/Data Review: All lab results for the last 24 hours reviewed.      Recent Results (from the past 24 hour(s))   URINALYSIS W/ REFLEX CULTURE    Collection Time: 01/21/21 12:24 AM    Specimen: Urine Result Value Ref Range    Color Yellow/Straw      Appearance Clear Clear      Specific gravity 1.027 1.003 - 1.030      pH (UA) 5.0 5.0 - 8.0      Protein 30 (A) Negative mg/dL    Glucose Negative Negative mg/dL    Ketone 5 (A) Negative mg/dL    Bilirubin Negative Negative      Blood Large (A) Negative      Urobilinogen 2.0 (H) 0.1 - 1.0 EU/dL    Nitrites Negative Negative      Leukocyte Esterase Negative Negative      WBC 0-4 0 - 4 /hpf    RBC >100 (H) 0 - 5 /hpf    Bacteria Negative Negative /hpf    UA:UC IF INDICATED Culture not indicated by UA result Culture not indicated by UA result      Mucus 2+ (A) Negative /lpf    Hyaline cast 0-2 0 - 5 /lpf         Assessment:     Active Problems:    Fever (12/29/2020)      AMS (altered mental status) (12/29/2020)      Encephalopathy acute (12/29/2020)    Psychosis acute  Plan:   Continue treatment    Signed By: Virgie Salas MD     January 21, 2021

## 2021-01-23 LAB — SARS-COV-2, COV2: NORMAL

## 2021-01-23 PROCEDURE — 65270000029 HC RM PRIVATE

## 2021-01-23 PROCEDURE — 74011250637 HC RX REV CODE- 250/637: Performed by: INTERNAL MEDICINE

## 2021-01-23 PROCEDURE — 74011250637 HC RX REV CODE- 250/637: Performed by: PSYCHIATRY & NEUROLOGY

## 2021-01-23 RX ADMIN — APIXABAN 2.5 MG: 2.5 TABLET, FILM COATED ORAL at 10:32

## 2021-01-23 RX ADMIN — DIVALPROEX SODIUM 750 MG: 500 TABLET, DELAYED RELEASE ORAL at 10:32

## 2021-01-23 RX ADMIN — METOPROLOL SUCCINATE 25 MG: 25 TABLET, EXTENDED RELEASE ORAL at 10:32

## 2021-01-23 RX ADMIN — HYDROXYZINE PAMOATE 25 MG: 25 CAPSULE ORAL at 21:06

## 2021-01-23 RX ADMIN — OLANZAPINE 7.5 MG: 5 TABLET, FILM COATED ORAL at 21:05

## 2021-01-23 RX ADMIN — AMLODIPINE BESYLATE 5 MG: 5 TABLET ORAL at 10:32

## 2021-01-23 RX ADMIN — APIXABAN 2.5 MG: 2.5 TABLET, FILM COATED ORAL at 21:06

## 2021-01-23 RX ADMIN — DIVALPROEX SODIUM 750 MG: 500 TABLET, DELAYED RELEASE ORAL at 21:05

## 2021-01-23 RX ADMIN — OLANZAPINE 7.5 MG: 5 TABLET, FILM COATED ORAL at 10:32

## 2021-01-23 RX ADMIN — HYDROXYZINE PAMOATE 25 MG: 25 CAPSULE ORAL at 00:13

## 2021-01-23 NOTE — PROGRESS NOTES
Progress Note    Patient: Kobi Grijalva MRN: [id number]  SSN: [id number]    YOB: 1940  Age: 68 y.o. Sex: male      Admit Date: 12/28/2020    LOS: 25 days     Subjective:   Patient 96% on room air    Objective:     Vitals:    01/22/21 1546 01/22/21 2054 01/23/21 0128 01/23/21 1550   BP: 130/68 123/72 136/65 133/66   Pulse: 81 86 75 84   Resp: 18 16 18 19   Temp: 97.8 °F (36.6 °C) 98.2 °F (36.8 °C) 97.9 °F (36.6 °C) 97.3 °F (36.3 °C)   SpO2: 98% 99% 97% 96%   Weight:       Height:            Intake and Output:  Current Shift: No intake/output data recorded. Last three shifts: 01/21 1901 - 01/23 0700  In: 1600 [P.O.:1600]  Out: -     Physical Exam:   General:  Alert, cooperative, no distress, appears stated age. Eyes:  Conjunctivae/corneas clear. PERRL, EOMs intact. Fundi benign   Ears:  Normal TMs and external ear canals both ears. Nose: Nares normal. Septum midline. Mucosa normal. No drainage or sinus tenderness. Mouth/Throat: Lips, mucosa, and tongue normal. Teeth and gums normal.   Neck: Supple, symmetrical, trachea midline, no adenopathy, thyroid: no enlargment/tenderness/nodules, no carotid bruit and no JVD. Back:   Symmetric, no curvature. ROM normal. No CVA tenderness. Lungs:   Clear to auscultation bilaterally. Heart:  Regular rate and rhythm, S1, S2 normal, no murmur, click, rub or gallop. Abdomen:   Soft, non-tender. Bowel sounds normal. No masses,  No organomegaly. Extremities: Extremities normal, atraumatic, no cyanosis or edema. Pulses: 2+ and symmetric all extremities. Skin: Skin color, texture, turgor normal. No rashes or lesions   Lymph nodes: Cervical, supraclavicular, and axillary nodes normal.   Neurologic: CNII-XII intact. Normal strength, sensation and reflexes throughout. Lab/Data Review: All lab results for the last 24 hours reviewed. No results found for this or any previous visit (from the past 24 hour(s)).       Assessment:     Active Problems:    Fever (12/29/2020)      AMS (altered mental status) (12/29/2020)      Encephalopathy acute (12/29/2020)    Psychosis acute  Plan:   Adjust Zyprexa and Depakote    Signed By: Pat Gomez MD     January 23, 2021

## 2021-01-23 NOTE — PROGRESS NOTES
Bedside shift change report given to Srini Velasquez (oncoming nurse) by Fred Ruggiero LPN (offgoing nurse). Report included the following information SBAR.

## 2021-01-24 LAB — SARS-COV-2, COV2NT: NOT DETECTED

## 2021-01-24 PROCEDURE — 74011250637 HC RX REV CODE- 250/637: Performed by: PSYCHIATRY & NEUROLOGY

## 2021-01-24 PROCEDURE — 65270000029 HC RM PRIVATE

## 2021-01-24 PROCEDURE — 74011250637 HC RX REV CODE- 250/637: Performed by: INTERNAL MEDICINE

## 2021-01-24 PROCEDURE — 74011250636 HC RX REV CODE- 250/636: Performed by: INTERNAL MEDICINE

## 2021-01-24 RX ADMIN — APIXABAN 2.5 MG: 2.5 TABLET, FILM COATED ORAL at 10:01

## 2021-01-24 RX ADMIN — APIXABAN 2.5 MG: 2.5 TABLET, FILM COATED ORAL at 20:59

## 2021-01-24 RX ADMIN — DIVALPROEX SODIUM 750 MG: 500 TABLET, DELAYED RELEASE ORAL at 10:01

## 2021-01-24 RX ADMIN — OLANZAPINE 7.5 MG: 5 TABLET, FILM COATED ORAL at 20:59

## 2021-01-24 RX ADMIN — METOPROLOL SUCCINATE 25 MG: 25 TABLET, EXTENDED RELEASE ORAL at 10:02

## 2021-01-24 RX ADMIN — OLANZAPINE 7.5 MG: 5 TABLET, FILM COATED ORAL at 10:01

## 2021-01-24 RX ADMIN — HALOPERIDOL LACTATE 1 MG: 5 INJECTION, SOLUTION INTRAMUSCULAR at 23:00

## 2021-01-24 RX ADMIN — HYDROXYZINE PAMOATE 25 MG: 25 CAPSULE ORAL at 20:59

## 2021-01-24 RX ADMIN — DIVALPROEX SODIUM 750 MG: 500 TABLET, DELAYED RELEASE ORAL at 20:59

## 2021-01-24 RX ADMIN — AMLODIPINE BESYLATE 5 MG: 5 TABLET ORAL at 10:01

## 2021-01-24 NOTE — PROGRESS NOTES
Progress Note    Patient: Chel Fragoso MRN: [id number]  SSN: [id number]    YOB: 1940  Age: 68 y.o. Sex: male      Admit Date: 12/28/2020    LOS: 26 days     Subjective:   Patient 96% on room air    Objective:     Vitals:    01/23/21 2000 01/24/21 0015 01/24/21 0750 01/24/21 1429   BP: (!) 142/73 136/72 (!) 150/88 106/70   Pulse: 75 82 (!) 102    Resp: 16 17 18 18   Temp: 98.1 °F (36.7 °C) 97.6 °F (36.4 °C) (!) 96.7 °F (35.9 °C) 97.7 °F (36.5 °C)   SpO2: 98% 97% 98% 99%   Weight:       Height:            Intake and Output:  Current Shift: No intake/output data recorded. Last three shifts: 01/22 1901 - 01/24 0700  In: 2100 [P.O.:2100]  Out: -     Physical Exam:   General:  Alert, cooperative, no distress, appears stated age. Eyes:  Conjunctivae/corneas clear. PERRL, EOMs intact. Fundi benign   Ears:  Normal TMs and external ear canals both ears. Nose: Nares normal. Septum midline. Mucosa normal. No drainage or sinus tenderness. Mouth/Throat: Lips, mucosa, and tongue normal. Teeth and gums normal.   Neck: Supple, symmetrical, trachea midline, no adenopathy, thyroid: no enlargment/tenderness/nodules, no carotid bruit and no JVD. Back:   Symmetric, no curvature. ROM normal. No CVA tenderness. Lungs:   Clear to auscultation bilaterally. Heart:  Regular rate and rhythm, S1, S2 normal, no murmur, click, rub or gallop. Abdomen:   Soft, non-tender. Bowel sounds normal. No masses,  No organomegaly. Extremities: Extremities normal, atraumatic, no cyanosis or edema. Pulses: 2+ and symmetric all extremities. Skin: Skin color, texture, turgor normal. No rashes or lesions   Lymph nodes: Cervical, supraclavicular, and axillary nodes normal.   Neurologic: CNII-XII intact. Normal strength, sensation and reflexes throughout. Lab/Data Review: All lab results for the last 24 hours reviewed.      No results found for this or any previous visit (from the past 24 hour(s)).       Assessment:     Active Problems:    Fever (12/29/2020)      AMS (altered mental status) (12/29/2020)      Encephalopathy acute (12/29/2020)    Psychosis acute  Plan:   Adjust Zyprexa and Depakote    Signed By: Gabrielle Delgado MD     January 24, 2021

## 2021-01-24 NOTE — PROGRESS NOTES
Bedside shift change report given to [facility] RN(oncoming nurse) by Maeve Hong LPN (offgoing nurse). Report included the following information SBAR.

## 2021-01-25 PROCEDURE — 65270000029 HC RM PRIVATE

## 2021-01-25 PROCEDURE — 74011250637 HC RX REV CODE- 250/637: Performed by: INTERNAL MEDICINE

## 2021-01-25 PROCEDURE — 74011250636 HC RX REV CODE- 250/636: Performed by: INTERNAL MEDICINE

## 2021-01-25 RX ORDER — DIVALPROEX SODIUM 250 MG/1
750 TABLET, DELAYED RELEASE ORAL 2 TIMES DAILY
Qty: 60 TAB | Refills: 0 | Status: SHIPPED | OUTPATIENT
Start: 2021-01-25

## 2021-01-25 RX ORDER — HYDROXYZINE PAMOATE 25 MG/1
25 CAPSULE ORAL
Qty: 30 CAP | Refills: 0 | Status: SHIPPED | OUTPATIENT
Start: 2021-01-25 | End: 2021-02-08

## 2021-01-25 RX ORDER — ACETAMINOPHEN 325 MG/1
650 TABLET ORAL
Qty: 30 TAB | Refills: 0 | Status: SHIPPED | OUTPATIENT
Start: 2021-01-25

## 2021-01-25 RX ORDER — AMLODIPINE BESYLATE 5 MG/1
5 TABLET ORAL DAILY
Qty: 30 TAB | Refills: 0 | Status: SHIPPED | OUTPATIENT
Start: 2021-01-26

## 2021-01-25 RX ORDER — OLANZAPINE 7.5 MG/1
7.5 TABLET ORAL 2 TIMES DAILY
Qty: 60 TAB | Refills: 0 | Status: SHIPPED | OUTPATIENT
Start: 2021-01-25

## 2021-01-25 RX ORDER — HYDRALAZINE HYDROCHLORIDE 25 MG/1
25 TABLET, FILM COATED ORAL
Qty: 90 TAB | Refills: 0 | Status: SHIPPED | OUTPATIENT
Start: 2021-01-25

## 2021-01-25 RX ORDER — FACIAL-BODY WIPES
10 EACH TOPICAL DAILY
Qty: 30 SUPPOSITORY | Refills: 0 | Status: SHIPPED | OUTPATIENT
Start: 2021-01-25

## 2021-01-25 RX ORDER — METOPROLOL SUCCINATE 25 MG/1
25 TABLET, EXTENDED RELEASE ORAL DAILY
Qty: 60 TAB | Refills: 0 | Status: SHIPPED | OUTPATIENT
Start: 2021-01-26

## 2021-01-25 RX ORDER — POLYETHYLENE GLYCOL 3350 17 G/17G
17 POWDER, FOR SOLUTION ORAL DAILY
Qty: 30 PACKET | Refills: 0 | Status: SHIPPED | OUTPATIENT
Start: 2021-01-25

## 2021-01-25 RX ADMIN — APIXABAN 2.5 MG: 2.5 TABLET, FILM COATED ORAL at 14:36

## 2021-01-25 RX ADMIN — OLANZAPINE 7.5 MG: 5 TABLET, FILM COATED ORAL at 23:15

## 2021-01-25 RX ADMIN — METOPROLOL SUCCINATE 25 MG: 25 TABLET, EXTENDED RELEASE ORAL at 14:36

## 2021-01-25 RX ADMIN — HALOPERIDOL LACTATE 1 MG: 5 INJECTION, SOLUTION INTRAMUSCULAR at 06:22

## 2021-01-25 RX ADMIN — OLANZAPINE 7.5 MG: 5 TABLET, FILM COATED ORAL at 14:36

## 2021-01-25 RX ADMIN — DIVALPROEX SODIUM 750 MG: 500 TABLET, DELAYED RELEASE ORAL at 23:15

## 2021-01-25 RX ADMIN — AMLODIPINE BESYLATE 5 MG: 5 TABLET ORAL at 14:36

## 2021-01-25 RX ADMIN — DIVALPROEX SODIUM 750 MG: 500 TABLET, DELAYED RELEASE ORAL at 14:36

## 2021-01-25 RX ADMIN — APIXABAN 2.5 MG: 2.5 TABLET, FILM COATED ORAL at 23:15

## 2021-01-25 NOTE — PROGRESS NOTES
MADDIE reached back out to Heather at [facility] [phone] to follow up regarding paperwork faxed. She stated she received all information needed and the patient could admit there tomorrow morning. CM inquired about the patient admitting today and she stated the patient would need to arrive earlier in the morning. She did confirm we could discharge the patient tomorrow morning. They would like transportation arranged for 8:30am. Patient will be going to:     [facility] at [address] [place] [address],[address] [place] [address], [address] [place] [address]  Phone: [phone]    Nursing report can be called to [phone] and patient is going to Room D10. CM contacted patient's wife, Mara Chu, at [phone], and notified her of the above information. She is agreeable to this plan with no issues or concerns. IMM provided verbally with no issue.

## 2021-01-25 NOTE — PROGRESS NOTES
All requested documents completed by the attending physician and have been faxed to [facility]. Luis Felipejoey Resendiz at [phone] and notified her. Negative covid test also faxed. She will review paperwork as soon as she receives it and let CM know if everything is okay. If so, she stated they will be able to admit the patient to their facility today. CM awaiting returned call.

## 2021-01-26 VITALS
HEIGHT: 70 IN | TEMPERATURE: 97.6 F | WEIGHT: 274 LBS | SYSTOLIC BLOOD PRESSURE: 124 MMHG | OXYGEN SATURATION: 99 % | HEART RATE: 81 BPM | BODY MASS INDEX: 39.22 KG/M2 | DIASTOLIC BLOOD PRESSURE: 61 MMHG | RESPIRATION RATE: 18 BRPM

## 2021-01-26 PROCEDURE — 74011250637 HC RX REV CODE- 250/637: Performed by: INTERNAL MEDICINE

## 2021-01-26 PROCEDURE — 74011250637 HC RX REV CODE- 250/637: Performed by: PSYCHIATRY & NEUROLOGY

## 2021-01-26 RX ADMIN — AMLODIPINE BESYLATE 5 MG: 5 TABLET ORAL at 07:45

## 2021-01-26 RX ADMIN — METOPROLOL SUCCINATE 25 MG: 25 TABLET, EXTENDED RELEASE ORAL at 07:44

## 2021-01-26 RX ADMIN — APIXABAN 2.5 MG: 2.5 TABLET, FILM COATED ORAL at 07:45

## 2021-01-26 RX ADMIN — DIVALPROEX SODIUM 750 MG: 500 TABLET, DELAYED RELEASE ORAL at 07:44

## 2021-01-26 RX ADMIN — OLANZAPINE 7.5 MG: 5 TABLET, FILM COATED ORAL at 07:43

## 2021-01-26 RX ADMIN — HYDROXYZINE PAMOATE 25 MG: 25 CAPSULE ORAL at 03:48

## 2021-01-26 NOTE — DISCHARGE SUMMARY
Discharge Summary     Patient: Tigist Valentin MRN: [id number]  SSN: [id number]    YOB: 1940  Age: 68 y.o. Sex: male       Admit Date: 12/28/2020    Discharge Date: 1/25/2021      Admission Diagnoses: AMS (altered mental status) [R41.82]  Fever [R50.9]  Encephalopathy acute [G93.40]    Discharge Diagnoses:   Problem List as of 1/25/2021 Never Reviewed          Codes Class Noted - Resolved    Fever ICD-10-CM: R50.9  ICD-9-CM: 780.60  12/29/2020 - Present        AMS (altered mental status) ICD-10-CM: R41.82  ICD-9-CM: 780.97  12/29/2020 - Present        Encephalopathy acute ICD-10-CM: G93.40  ICD-9-CM: 348.30  12/29/2020 - Present               Discharge Condition: Stable    Hospital Course: 68years old patient was history of CAD, dementia sent to the hospital for altered mental status patient had progressive agitation was admitted with diagnosis of dementia with agitation patient was seen by psychiatrist.  She was placed on multiple antipsychotic medication for better control with not enough success. Consults: Psychiatry    Significant Diagnostic Studies: labs: No results found for this or any previous visit (from the past 24 hour(s)). CT HEAD WO CONT   Final Result   Impression:   No acute intracranial abnormality. XR CHEST PORT   Final Result   Impression:   No acute cardiopulmonary findings. Disposition: SNF    Discharge Medications:   Current Discharge Medication List      START taking these medications    Details   acetaminophen (TYLENOL) 325 mg tablet Take 2 Tabs by mouth every six (6) hours as needed for Pain. Qty: 30 Tab, Refills: 0      amLODIPine (NORVASC) 5 mg tablet Take 1 Tab by mouth daily. Qty: 30 Tab, Refills: 0      apixaban (ELIQUIS) 2.5 mg tablet Take 1 Tab by mouth two (2) times a day. Qty: 60 Tab, Refills: 0      bisacodyL (DULCOLAX) 10 mg supp Insert 10 mg into rectum daily.   Qty: 30 Suppository, Refills: 0      zinc oxide-cod liver oil 40 % pste 30 g, nystatin 100,000 unit/gram oint 30 g ointment Apply 1 g to affected area as needed for Other (rash). Qty: 1 Tube, Refills: 0      divalproex DR (DEPAKOTE) 250 mg tablet Take 3 Tabs by mouth two (2) times a day. Qty: 60 Tab, Refills: 0      hydrALAZINE (APRESOLINE) 25 mg tablet Take 1 Tab by mouth three (3) times daily as needed (sbp> 160). Qty: 90 Tab, Refills: 0      hydrOXYzine pamoate (VISTARIL) 25 mg capsule Take 1 Cap by mouth three (3) times daily as needed for Anxiety for up to 14 days. Indications: anxious  Qty: 30 Cap, Refills: 0      metoprolol succinate (TOPROL-XL) 25 mg XL tablet Take 1 Tab by mouth daily. Qty: 60 Tab, Refills: 0      OLANZapine (ZyPREXA) 7.5 mg tablet Take 1 Tab by mouth two (2) times a day. Qty: 60 Tab, Refills: 0      polyethylene glycol (MIRALAX) 17 gram packet Take 1 Packet by mouth daily.   Qty: 30 Packet, Refills: 0             Activity: Activity as tolerated  Diet: Resume previous diet  Wound Care: None needed      Follow-up Appointments   Procedures    FOLLOW UP VISIT Appointment in: 3 - 5 Days     Standing Status:   Standing     Number of Occurrences:   1     Order Specific Question:   Appointment in     Answer:   3 - 5 Days   45 minutes discharge time    Signed By: Jovita Dykes MD     January 25, 2021

## 2021-01-26 NOTE — PROGRESS NOTES
Patient dc to [facility] via ambulance. Alert and oriented to self.  Report called to bobbi [email or web address]

## 2022-03-18 PROBLEM — R50.9 FEVER: Status: ACTIVE | Noted: 2020-12-29

## 2022-03-18 PROBLEM — R41.82 AMS (ALTERED MENTAL STATUS): Status: ACTIVE | Noted: 2020-12-29

## 2022-03-19 PROBLEM — G93.40 ENCEPHALOPATHY ACUTE: Status: ACTIVE | Noted: 2020-12-29

## 2023-05-22 RX ORDER — BISACODYL 10 MG
10 SUPPOSITORY, RECTAL RECTAL DAILY
COMMUNITY
Start: 2021-01-25

## 2023-05-22 RX ORDER — HYDRALAZINE HYDROCHLORIDE 25 MG/1
25 TABLET, FILM COATED ORAL 3 TIMES DAILY PRN
COMMUNITY
Start: 2021-01-25

## 2023-05-22 RX ORDER — DIVALPROEX SODIUM 250 MG/1
750 TABLET, DELAYED RELEASE ORAL 2 TIMES DAILY
COMMUNITY
Start: 2021-01-25

## 2023-05-22 RX ORDER — OLANZAPINE 7.5 MG/1
7.5 TABLET ORAL 2 TIMES DAILY
COMMUNITY
Start: 2021-01-25

## 2023-05-22 RX ORDER — ACETAMINOPHEN 325 MG/1
650 TABLET ORAL EVERY 6 HOURS PRN
COMMUNITY
Start: 2021-01-25

## 2023-05-22 RX ORDER — POLYETHYLENE GLYCOL 3350 17 G/17G
17 POWDER, FOR SOLUTION ORAL DAILY
COMMUNITY
Start: 2021-01-25

## 2023-05-22 RX ORDER — AMLODIPINE BESYLATE 5 MG/1
5 TABLET ORAL DAILY
COMMUNITY
Start: 2021-01-26

## 2023-05-22 RX ORDER — METOPROLOL SUCCINATE 25 MG/1
25 TABLET, EXTENDED RELEASE ORAL DAILY
COMMUNITY
Start: 2021-01-26